# Patient Record
Sex: FEMALE | Race: OTHER | URBAN - METROPOLITAN AREA
[De-identification: names, ages, dates, MRNs, and addresses within clinical notes are randomized per-mention and may not be internally consistent; named-entity substitution may affect disease eponyms.]

---

## 2019-09-11 ENCOUNTER — OFFICE VISIT (OUTPATIENT)
Dept: FAMILY MEDICINE CLINIC | Age: 54
End: 2019-09-11

## 2019-09-11 VITALS
SYSTOLIC BLOOD PRESSURE: 140 MMHG | WEIGHT: 177 LBS | DIASTOLIC BLOOD PRESSURE: 84 MMHG | TEMPERATURE: 96.6 F | RESPIRATION RATE: 18 BRPM | HEART RATE: 66 BPM | OXYGEN SATURATION: 99 %

## 2019-09-11 DIAGNOSIS — F43.9 STRESS: ICD-10-CM

## 2019-09-11 DIAGNOSIS — M79.601 PAIN IN INFERIOR RIGHT UPPER EXTREMITY: Primary | ICD-10-CM

## 2019-09-11 RX ORDER — CYCLOBENZAPRINE HCL 10 MG
10 TABLET ORAL
Qty: 14 TAB | Refills: 1 | Status: SHIPPED | OUTPATIENT
Start: 2019-09-11 | End: 2019-09-26 | Stop reason: SDUPTHER

## 2019-09-11 NOTE — PATIENT INSTRUCTIONS
Elinor: Ejercicios - [ Neck: Exercises ]  Instrucciones de cuidado  Aquí se presentan algunos ejemplos de ejercicios típicos de rehabilitación para tratar haney afección. Empiece cada ejercicio lentamente. Reduzca la intensidad del ejercicio si Bernis Sabot a tener dolor. Haney médico o fisioterapeuta le dirán cuándo puede comenzar con estos ejercicios y cuáles funcionarán mejor para usted. Cómo hacer los ejercicios  Estiramiento del elinor    1. Marianela estiramiento funciona mejor si mantiene el hombro hacia abajo mientras se inclina en sentido contrario. Para ayudarle a acordarse de esto, empiece por relajar los hombros y asirse levemente a henrique muslos o a haney silla. 2. Incline la lila hacia el hombro y Bennet 15 a 30 segundos. Deje que el peso de la lila estire los músculos. 3. Si desea un pequeño estiramiento adicional, use haney mano para jalar Pharr y constantemente la lila hacia el hombro. Por ejemplo, con haney hombro derecho hacia abajo, incline haeny lila hacia la izquierda. 4. Repita de 2 a 4 veces para cada hombro. Estiramiento del elinor en diagonal    1. Gire la lila ligeramente en la dirección en la que esté haciendo la extensión, e incline la lila en diagonal hacia el pecho y Bennet 15 a 30 segundos. 2. Si desea un pequeño estiramiento adicional, use haney mano para jalar suave y constantemente la lila hacia adelante en diagonal.  3. Repita de 2 a 4 veces para cada lado. Estiramiento con deslizamiento dorsal    1. Siéntese o párese derecho y [employer]. 2. Poco a poco meta la barbilla mientras desliza la lila hacia atrás sobre haney cuerpo. 3. Manténgala contando hasta 6 y luego relájela aris 10 segundos. 4. Repita de 8 a 12 veces. Estiramiento del pecho y del hombro    1.  Siéntese o párese derecho y deslice la lila hacia atrás kyle en el estiramiento con deslizamiento dorsal.  2. Levante ambos brazos para que henrique elbert queden cerca de los oídos.  3. [place] venus respiración profunda, y a medida que [place], lleve los codos Friend abajo y detrás de la espalda. Sentirá que los omóplatos Rosa Alert y Asia Le, y al mismo tiempo sentirá un estiramiento en el pecho y en la parte delantera de henrique hombros.  4. Manténgalos así aris unos 6 segundos y luego relájelos aris 10 segundos. 5. Repita de 8 a 12 veces. Fortalecimiento: Judith en la lila    1. Mueva haney lila hacia atrás, hacia adelante y de lado a lado en venus suave presión contra henrique judith, manteniendo cada posición aris unos 6 segundos. 2. Repita de 8 a 12 veces. La atención de seguimiento es venus parte clave de haney tratamiento y seguridad. Asegúrese de hacer y acudir a todas las citas, y llame a haney médico si está teniendo problemas. También es venus buena idea saber los resultados de henrique exámenes y mantener venus lista de los medicamentos que hay. ¿Dónde puede encontrar más información en inglés? Erin isbell http://nahum-darwin.info/. Escriba P975 en la búsqueda para aprender más acerca de \"Elinor: Ejercicios - [ Neck: Exercises ]. \"  Revisado: 20 septiembre, 2018  Versión del contenido: 12.1  © 3268-3672 [employer]wise, Inc[employer]. Las instrucciones de cuidado fueron adaptadas bajo licencia por [employer] (which disclaims liability or warranty for this information). Si usted tiene [place] afección médica o sobre estas instrucciones, siempre pregunte a haney profesional de damion. Healthwise, Incorporated niega toda garantía o responsabilidad por haney uso de esta información.

## 2019-09-11 NOTE — PROGRESS NOTES
Chief Complaint   Patient presents with    Physical     check up    New Patient     Chief Complaint   Patient presents with    Physical     check up    New Patient     1. When and where did you last receive medical care? Yes When: 6/2019 - bug bites on right leg - [facility]  2. When and where did you last have preventive care such as mammogram, pap smears or colon screening? No history of MAMMO or  PAP screeing  3. What is your current living situation (for example, live alone, live in home with immediate family members)? Lives with  and youngest daughter and grandaughter    4. Do you have any problems with communication such trouble seeing, hearing, or understanding instructions? Yes  Khmer only -   5. Do you have an advance directive? This is a document that you can give to family members with instructions for how you would want them to make health care decisions for you if you were unable to speak for yourself. (For example, unconscious, delerious)No    PMH/FH/Social Hx reviewed and updated as needed     Applicable screenings reviewed and updated as needed  Medication reconciliation performed. Patient does not know need medication refills. Health Maintenance reviewed.

## 2019-09-11 NOTE — PROGRESS NOTES
LON Jeter is a 48 y.o. female being seen today for   Chief Complaint   Patient presents with    Physical     check up    New Patient   . IOV for this pt to care a Theaubree Miller.   she states that she is generally healthy. Here for physical.   Has not had pap or mammogram ever. Feels pain radiating down right arm to hand. Has jonathan present for a month but feels similar to pinched nerve she had last year. Last year resolved with massage. No new injury. Recent stress when her daughter left or was deported. Sometimes with chest pain and palpitations as well since that time. No pattern to pain. She has not noticed any worsening with exxercise or activity. This is when her pain started but she had no injury. Also has sweats at night. History reviewed. No pertinent past medical history. ROS  Patient states that she is feeling well. Denies complaints of , shortness of breath, swelling of legs, dizziness or weakness. she denies nausea, vomiting or diarrhea. Current Outpatient Medications   Medication Sig    cyclobenzaprine (FLEXERIL) 10 mg tablet Take 1 Tab by mouth nightly. No current facility-administered medications for this visit. PE  Visit Vitals  /84 (BP 1 Location: Right arm, BP Patient Position: Sitting)   Pulse 66   Temp 96.6 °F (35.9 °C) (Temporal)   Resp 18   Wt 177 lb (80.3 kg)   LMP 2019 (Approximate) Comment: 3 months since last cycle   SpO2 99%        Alert and oriented with normal mood and affect. she is well developed and well nourished . Lungs are clear without wheezing. Heart rate is regular without murmurs or gallops. There is no lower extremity edema. Normal ROM neck but does have reproduction of pain with extremes of rotation or with palpation base of neck on right. Normal  strength and sensation in hands. No results found for this or any previous visit. Assessment and Plan:        ICD-10-CM ICD-9-CM    1.  Pain in inferior right upper extremity  Likely pinched nerve  Trial HEP and home exercises  Start financial screening for PT and mammogram M79.601 729.5    2.  Stress  reveiewed stress reduction strategies F43.9 V62.89        Follow up 2-4 weeks for WWE and recheck and flu shot  She may not be eligible for mammogram through [facility] so will start [address] screening      Allie Olson MD

## 2019-09-26 ENCOUNTER — HOSPITAL ENCOUNTER (OUTPATIENT)
Dept: LAB | Age: 54
Discharge: HOME | End: 2019-09-26

## 2019-09-26 ENCOUNTER — OFFICE VISIT (OUTPATIENT)
Dept: FAMILY MEDICINE CLINIC | Age: 54
End: 2019-09-26

## 2019-09-26 VITALS
HEIGHT: 67 IN | SYSTOLIC BLOOD PRESSURE: 149 MMHG | RESPIRATION RATE: 14 BRPM | WEIGHT: 172 LBS | DIASTOLIC BLOOD PRESSURE: 90 MMHG | BODY MASS INDEX: 27 KG/M2

## 2019-09-26 DIAGNOSIS — Z23 ENCOUNTER FOR IMMUNIZATION: ICD-10-CM

## 2019-09-26 DIAGNOSIS — Z12.11 SCREENING FOR COLON CANCER: ICD-10-CM

## 2019-09-26 DIAGNOSIS — Z12.4 SCREENING FOR CERVICAL CANCER: Primary | ICD-10-CM

## 2019-09-26 PROCEDURE — 88142 CYTOPATH C/V THIN LAYER: CPT

## 2019-09-26 RX ORDER — CYCLOBENZAPRINE HCL 10 MG
10 TABLET ORAL
Qty: 30 TAB | Refills: 1 | Status: SHIPPED | OUTPATIENT
Start: 2019-09-26 | End: 2020-01-24 | Stop reason: SDUPTHER

## 2019-09-26 NOTE — PROGRESS NOTES
Callie Kent is a 48 y.o. female who presents for routine immunizations. She denies any symptoms , reactions or allergies that would exclude them from being immunized today. Risks and adverse reactions were discussed and the VIS was given to them. All questions were addressed. She was observed for 10 min post injection. There were no reactions observed.     Vermond [employer]

## 2019-09-26 NOTE — PATIENT INSTRUCTIONS
Vaccine Information Statement    Influenza (Flu) Vaccine (Inactivated or Recombinant): What You Need to Know    Many Vaccine Information Statements are available in Yoruba and other languages. See www.immunize.org/vis  Hojas de información sobre vacunas están disponibles en español y en muchos otros idiomas. Visite www.immunize.org/vis    1. Why get vaccinated? Influenza vaccine can prevent influenza (flu). Flu is a contagious disease that spreads around the [facility] every year, usually between October and May. Anyone can get the flu, but it is more dangerous for some people. Infants and young children, people 72years of age and older, pregnant women, and people with certain health conditions or a weakened immune system are at greatest risk of flu complications. Pneumonia, bronchitis, sinus infections and ear infections are examples of flu-related complications. If you have a medical condition, such as heart disease, cancer or diabetes, flu can make it worse. Flu can cause fever and chills, sore throat, muscle aches, fatigue, cough, headache, and runny or stuffy nose. Some people may have vomiting and diarrhea, though this is more common in children than adults. Each year thousands of people in the [facility] die from flu, and many more are hospitalized. Flu vaccine prevents millions of illnesses and flu-related visits to the doctor each year. 2. Influenza vaccines     CDC recommends everyone 10months of age and older get vaccinated every flu season. Children 6 months through 6years of age may need 2 doses during a single flu season. Everyone else needs only 1 dose each flu season. It takes about 2 weeks for protection to develop after vaccination. There are many flu viruses, and they are always changing. Each year a new flu vaccine is made to protect against three or four viruses that are likely to cause disease in the upcoming flu season.  Even when the vaccine doesnt exactly match these viruses, it may still provide some protection. Influenza vaccine does not cause flu. Influenza vaccine may be given at the same time as other vaccines. 3. Talk with your health care provider    Tell your vaccine provider if the person getting the vaccine:   Has had an allergic reaction after a previous dose of influenza vaccine, or has any severe, life-threatening allergies.  Has ever had Guillain-Barré Syndrome (also called GBS). In some cases, your health care provider may decide to postpone influenza vaccination to a future visit. People with minor illnesses, such as a cold, may be vaccinated. People who are moderately or severely ill should usually wait until they recover before getting influenza vaccine. Your health care provider can give you more information. 4. Risks of a reaction     Soreness, redness, and swelling where shot is given, fever, muscle aches, and headache can happen after influenza vaccine.  There may be a very small increased risk of Guillain-Barré Syndrome (GBS) after inactivated influenza vaccine (the flu shot). Desi Dietrich children who get the flu shot along with pneumococcal vaccine (PCV13), and/or DTaP vaccine at the same time might be slightly more likely to have a seizure caused by fever. Tell your health care provider if a child who is getting flu vaccine has ever had a seizure. People sometimes faint after medical procedures, including vaccination. Tell your provider if you feel dizzy or have vision changes or ringing in the ears. As with any medicine, there is a very remote chance of a vaccine causing a severe allergic reaction, other serious injury, or death. 5. What if there is a serious problem? An allergic reaction could occur after the vaccinated person leaves the clinic.  If you see signs of a severe allergic reaction (hives, swelling of the face and throat, difficulty breathing, a fast heartbeat, dizziness, or weakness), call 9-1-1 and get the person to the nearest hospital.    For other signs that concern you, call your health care provider. Adverse reactions should be reported to the Vaccine Adverse Event Reporting System (VAERS). Your health care provider will usually file this report, or you can do it yourself. Visit the VAERS website at www.vaers. [facility].gov or call 0-773.796.3510. VAERS is only for reporting reactions, and VAERS staff do not give medical advice. 6. The National Vaccine Injury Compensation Program    The [facility] Vaccine Injury Compensation Program (VICP) is a federal program that was created to compensate people who may have been injured by certain vaccines. Visit the VICP website at www.[facility].gov/vaccinecompensation or call 6-447.171.3673 to learn about the program and about filing a claim. There is a time limit to file a claim for compensation. 7. How can I learn more?  Ask your health care provider.  Call your local or state health department.  Contact the Centers for Disease Control and Prevention (CDC):  - Call 5-177.850.6387 (1-800-CDC-INFO) or  - Visit CDCs influenza website at www.cdc.gov/flu    Vaccine Information Statement (Interim)  Inactivated Influenza Vaccine   8/15/2019  42 BREANN Menon [id number]   Department of Health and Human Services  Centers for Disease Control and Prevention    Office Use Only

## 2019-09-26 NOTE — PROGRESS NOTES
LON Ramirez is a 48 y.o. female being seen today for   Chief Complaint   Patient presents with    Well Woman     PAP Smear    Medication Refill     flexeril   . follow up for this pt with neck pain and pinched nerve. she states that the flexeril worked for her arm pain. She is taking every other nigiht. Would like refill. Also here for  pap and flu shot. She has never had pap before. Still having periods but they are spacing out to every few months. No past medical history on file. ROS  Patient states that she is feeling well. Denies complaints of chest pain, shortness of breath, swelling of legs, dizziness or weakness. she denies nausea, vomiting or diarrhea. Current Outpatient Medications   Medication Sig    cyclobenzaprine (FLEXERIL) 10 mg tablet Take 1 Tab by mouth nightly. No current facility-administered medications for this visit. PE  Visit Vitals  /90 (BP 1 Location: Left arm, BP Patient Position: Sitting)   Resp 14   Ht 5' 7\" (1.702 m)   Wt 172 lb (78 kg)   BMI 26.94 kg/m²        Alert and oriented with normal mood and affect. she is well developed and well nourished . Speculum exam shows normal appearing external genitalia and cervix. Bimanual exam with no mass or tenderness. No results found for this or any previous visit. Assessment and Plan:        ICD-10-CM ICD-9-CM    1. Screening for cervical cancer Z12.4 V76.2 PAP, LB, RFX HPV SOAAR([id number])   2.  Screening for colon cancer Z12.11 V76.51 OCCULT BLOOD IMMUNOASSAY,DIAGNOSTIC       Pap smear today  Refill flexeril  Return prn  Will order mammogram when her financial screening is complete    Tamara Whittington MD

## 2019-10-03 ENCOUNTER — TELEPHONE (OUTPATIENT)
Dept: FAMILY MEDICINE CLINIC | Age: 54
End: 2019-10-03

## 2019-10-04 NOTE — TELEPHONE ENCOUNTER
I called this patient and was unable to reach her. I tried to leave a voice mail but her phone did not allow me to. I will try again on both phones. ,

## 2019-10-07 ENCOUNTER — HOSPITAL ENCOUNTER (OUTPATIENT)
Dept: LAB | Age: 54
Discharge: HOME | End: 2019-10-07

## 2019-10-07 DIAGNOSIS — Z12.11 SCREENING FOR COLON CANCER: ICD-10-CM

## 2019-10-07 PROCEDURE — 82274 ASSAY TEST FOR BLOOD FECAL: CPT

## 2019-10-15 LAB — HEMOCCULT STL QL IA: NEGATIVE

## 2020-01-24 ENCOUNTER — OFFICE VISIT (OUTPATIENT)
Dept: FAMILY MEDICINE CLINIC | Age: 55
End: 2020-01-24

## 2020-01-24 ENCOUNTER — HOSPITAL ENCOUNTER (OUTPATIENT)
Dept: LAB | Age: 55
Discharge: HOME | End: 2020-01-24

## 2020-01-24 VITALS
HEIGHT: 67 IN | WEIGHT: 176 LBS | BODY MASS INDEX: 27.62 KG/M2 | HEART RATE: 75 BPM | SYSTOLIC BLOOD PRESSURE: 142 MMHG | RESPIRATION RATE: 17 BRPM | OXYGEN SATURATION: 97 % | DIASTOLIC BLOOD PRESSURE: 84 MMHG | TEMPERATURE: 97.4 F

## 2020-01-24 DIAGNOSIS — R73.9 ELEVATED BLOOD SUGAR: Primary | ICD-10-CM

## 2020-01-24 DIAGNOSIS — E11.9 CONTROLLED TYPE 2 DIABETES MELLITUS WITHOUT COMPLICATION, WITHOUT LONG-TERM CURRENT USE OF INSULIN (HCC): ICD-10-CM

## 2020-01-24 PROCEDURE — 82043 UR ALBUMIN QUANTITATIVE: CPT

## 2020-01-24 PROCEDURE — 84443 ASSAY THYROID STIM HORMONE: CPT

## 2020-01-24 PROCEDURE — 80053 COMPREHEN METABOLIC PANEL: CPT

## 2020-01-24 PROCEDURE — 80061 LIPID PANEL: CPT

## 2020-01-24 PROCEDURE — 85025 COMPLETE CBC W/AUTO DIFF WBC: CPT

## 2020-01-24 RX ORDER — METFORMIN HYDROCHLORIDE 500 MG/1
500 TABLET, EXTENDED RELEASE ORAL 2 TIMES DAILY
Qty: 60 TAB | Refills: 2 | Status: SHIPPED | OUTPATIENT
Start: 2020-01-24 | End: 2020-05-05 | Stop reason: SDUPTHER

## 2020-01-24 RX ORDER — CYCLOBENZAPRINE HCL 10 MG
10 TABLET ORAL
Qty: 30 TAB | Refills: 1 | Status: SHIPPED | OUTPATIENT
Start: 2020-01-24 | End: 2020-03-15

## 2020-01-24 RX ORDER — GLIPIZIDE 5 MG/1
5 TABLET ORAL 2 TIMES DAILY
Qty: 60 TAB | Refills: 2 | Status: SHIPPED | OUTPATIENT
Start: 2020-01-24 | End: 2020-05-05 | Stop reason: SDUPTHER

## 2020-01-24 NOTE — PROGRESS NOTES
Diabetic foot exam performed by Rakan Garrison LPN      Measurement  Response Nurse Comment Physician Comment   Monofilament  R - 6/6  L - 6/6     Pulse DP R - present  L - present     Pulse TP R - present  L - present     Structural deformity R - no     Skin Integrity / Deformity R - no          Reviewed by:      Blood work drawn      Discharge instructions reviewed with patient    Medication list and understanding of medications reviewed with patient. OTC and herbal medications reviewed and added to med list if applicable  Barriers to adherence assessed. Guidance given regarding new medications this visit, including reason for taking this medicine, and common side effects. AVS given to patient. Explained to patient. Patient expressed understanding.

## 2020-01-24 NOTE — PROGRESS NOTES
Chief Complaint   Patient presents with    Other     refill/ check for diabetes   1. Have you been to the ER, urgent care clinic since your last visit? Hospitalized since your last visit? No    2. Have you seen or consulted any other health care providers outside of the [address] [place] [address] since your last visit? Include any pap smears or colon screening.  No

## 2020-01-24 NOTE — PROGRESS NOTES
HPI  Melecio Bower is a 47 y.o. female being seen today for   Chief Complaint   Patient presents with    Other     refill/ check for diabetes   . she states that she would like to get screened for diabetes. She has checked her blood sugars at home and they are high. Sometimes she feels nauseated and her sugars have been up to 400. Blurry vision at these tmes as well but it resolves. She walks sometimes for exercise. Tries to eat healthy, some tortillas but a lot of vegetables. Rarely eats sweets. Some carbs but she tries to limit these. No family history of diabetes. No smoking or drinking alcohol  She rarely drinks soda, mostly water. History reviewed. No pertinent past medical history. ROS  Patient states that she is feeling well. Denies complaints of chest pain, shortness of breath, swelling of legs, dizziness or weakness. she denies nausea, vomiting or diarrhea. Current Outpatient Medications   Medication Sig    cyclobenzaprine (FLEXERIL) 10 mg tablet Take 1 Tab by mouth nightly as needed for Muscle Spasm(s).  metFORMIN ER (GLUCOPHAGE XR) 500 mg tablet Take 1 Tab by mouth two (2) times a day.  glipiZIDE (GLUCOTROL) 5 mg tablet Take 1 Tab by mouth two (2) times a day. No current facility-administered medications for this visit. PE  Visit Vitals  /84 (BP 1 Location: Right arm, BP Patient Position: Sitting)   Pulse 75   Temp 97.4 °F (36.3 °C) (Temporal)   Resp 17   Ht 5' 7\" (1.702 m)   Wt 176 lb (79.8 kg)   SpO2 97%   BMI 27.57 kg/m²        Alert and oriented with normal mood and affect. she is well developed and well nourished . Lungs are clear without wheezing. Heart rate is regular without murmurs or gallops. There is no lower extremity edema.      Results for orders placed or performed during the hospital encounter of 10/07/19   OCCULT BLOOD IMMUNOASSAY,DIAGNOSTIC   Result Value Ref Range    Occult blood fecal, by IA NEGATIVE  NEGATIVE Assessment and Plan:        ICD-10-CM ICD-9-CM    1. Elevated blood sugar R73.9 790.29 AMB POC HEMOGLOBIN A1C   2. Controlled type 2 diabetes mellitus without complication, without long-term current use of insulin (HCC) E11.9 250.00      New dx diabetes. May need insulin but will start with oral therapy and diet control. She was given a meter and return for a visit in a few weeks to review bg log.         Heather Simpson MD

## 2020-01-25 LAB
ALBUMIN SERPL-MCNC: 3.8 G/DL (ref 3.4–5)
ALBUMIN/GLOB SERPL: 0.9 {RATIO} (ref 0.8–1.7)
ALP SERPL-CCNC: 143 U/L (ref 45–117)
ALT SERPL-CCNC: 49 U/L (ref 13–56)
ANION GAP SERPL CALC-SCNC: 7 MMOL/L (ref 3–18)
AST SERPL-CCNC: 34 U/L (ref 10–38)
BASOPHILS # BLD: 0 K/UL (ref 0–0.1)
BASOPHILS NFR BLD: 0 % (ref 0–2)
BILIRUB SERPL-MCNC: 0.3 MG/DL (ref 0.2–1)
BUN SERPL-MCNC: 8 MG/DL (ref 7–18)
BUN/CREAT SERPL: 11 (ref 12–20)
CALCIUM SERPL-MCNC: 9.7 MG/DL (ref 8.5–10.1)
CHLORIDE SERPL-SCNC: 102 MMOL/L (ref 100–111)
CHOLEST SERPL-MCNC: 275 MG/DL
CO2 SERPL-SCNC: 28 MMOL/L (ref 21–32)
CREAT SERPL-MCNC: 0.76 MG/DL (ref 0.6–1.3)
CREAT UR-MCNC: 50 MG/DL (ref 30–125)
DIFFERENTIAL METHOD BLD: ABNORMAL
EOSINOPHIL # BLD: 0 K/UL (ref 0–0.4)
EOSINOPHIL NFR BLD: 1 % (ref 0–5)
ERYTHROCYTE [DISTWIDTH] IN BLOOD BY AUTOMATED COUNT: 13.1 % (ref 11.6–14.5)
GLOBULIN SER CALC-MCNC: 4.1 G/DL (ref 2–4)
GLUCOSE SERPL-MCNC: 397 MG/DL (ref 74–99)
HCT VFR BLD AUTO: 43.7 % (ref 35–45)
HDLC SERPL-MCNC: 59 MG/DL (ref 40–60)
HDLC SERPL: 4.7 {RATIO} (ref 0–5)
HGB BLD-MCNC: 14.6 G/DL (ref 12–16)
LDLC SERPL CALC-MCNC: 169.2 MG/DL (ref 0–100)
LIPID PROFILE,FLP: ABNORMAL
LYMPHOCYTES # BLD: 2 K/UL (ref 0.9–3.6)
LYMPHOCYTES NFR BLD: 36 % (ref 21–52)
MCH RBC QN AUTO: 30.4 PG (ref 24–34)
MCHC RBC AUTO-ENTMCNC: 33.4 G/DL (ref 31–37)
MCV RBC AUTO: 90.9 FL (ref 74–97)
MICROALBUMIN UR-MCNC: <0.5 MG/DL (ref 0–3)
MICROALBUMIN/CREAT UR-RTO: NORMAL MG/G (ref 0–30)
MONOCYTES # BLD: 0.4 K/UL (ref 0.05–1.2)
MONOCYTES NFR BLD: 7 % (ref 3–10)
NEUTS SEG # BLD: 3.1 K/UL (ref 1.8–8)
NEUTS SEG NFR BLD: 56 % (ref 40–73)
PLATELET # BLD AUTO: 232 K/UL (ref 135–420)
PMV BLD AUTO: 12.3 FL (ref 9.2–11.8)
POTASSIUM SERPL-SCNC: 5.1 MMOL/L (ref 3.5–5.5)
PROT SERPL-MCNC: 7.9 G/DL (ref 6.4–8.2)
RBC # BLD AUTO: 4.81 M/UL (ref 4.2–5.3)
SODIUM SERPL-SCNC: 137 MMOL/L (ref 136–145)
TRIGL SERPL-MCNC: 234 MG/DL (ref ?–150)
TSH SERPL DL<=0.05 MIU/L-ACNC: 2.31 UIU/ML (ref 0.36–3.74)
VLDLC SERPL CALC-MCNC: 46.8 MG/DL
WBC # BLD AUTO: 5.5 K/UL (ref 4.6–13.2)

## 2020-01-30 PROBLEM — E11.9 CONTROLLED TYPE 2 DIABETES MELLITUS WITHOUT COMPLICATION, WITHOUT LONG-TERM CURRENT USE OF INSULIN (HCC): Status: ACTIVE | Noted: 2020-01-30

## 2020-02-04 ENCOUNTER — TELEPHONE (OUTPATIENT)
Dept: FAMILY MEDICINE CLINIC | Age: 55
End: 2020-02-04

## 2020-02-04 NOTE — TELEPHONE ENCOUNTER
I called this patient per provider request but the voice mail has not been set up. I was not able to leave a message. I will continue trying .

## 2020-02-11 ENCOUNTER — TELEPHONE (OUTPATIENT)
Dept: FAMILY MEDICINE CLINIC | Age: 55
End: 2020-02-11

## 2020-02-11 NOTE — TELEPHONE ENCOUNTER
Kelly Rubin LPN   Licensed Nurse      Telephone Encounter   Signed   Encounter Date:  2/4/2020                    []Hide copied text    []Hover for details  THis is a corrected message from previously submitted Telephone Encounter.  :  I reached this Venezuelan Speaking Only patient at home and related the provider requested message in [address] The patient stated that she understood the results of her blood work. Patient requested the provider send her the new prescription to her pharmacy where she will pick it up tomorrow.       Electronically signed by Kelly Rubin LPN at 39/87/51 8723   Note Details     Author Kelly Rubin LPN File Time 83/38/67 [id number]   Author Type Licensed Nurse Status Signed   Last  Kelly Rubin LPN Service (none)       Telephone on 2/4/2020          Detailed Report

## 2020-02-11 NOTE — TELEPHONE ENCOUNTER
I reached this Greek Speaking Only patient at home and related the provider requested message in [address] The patient stated that she understood the results of both her blood work and the ultrasound and requested the provider send her the new prescription to her pharmacy where she will pick it up tomorrow.

## 2020-02-11 NOTE — TELEPHONE ENCOUNTER
----- Message from Sharyle Addison, MD sent at 1/27/2020  2:33 PM EST -----  I will send a letter with her results but her kidneys are healthy and all tests are normal except high cholesterol and high blood sugar. This may get better as we improve her blood sugars but it is recommended for her to take a cholesterol medicine as well. I can call this in for her or she can wait to follow up appt.      Thank you!  ----- Message -----  From: Freedom Hyman In [place]  Sent: 1/25/2020  12:16 AM EST  To: Sharyle Addison, MD

## 2020-02-13 RX ORDER — ATORVASTATIN CALCIUM 40 MG/1
40 TABLET, FILM COATED ORAL DAILY
Qty: 30 TAB | Refills: 5 | Status: SHIPPED | OUTPATIENT
Start: 2020-02-13 | End: 2020-11-05 | Stop reason: SDUPTHER

## 2020-03-15 RX ORDER — CYCLOBENZAPRINE HCL 10 MG
TABLET ORAL
Qty: 30 TAB | Refills: 0 | Status: SHIPPED | OUTPATIENT
Start: 2020-03-15 | End: 2020-05-05 | Stop reason: SDUPTHER

## 2020-05-05 RX ORDER — GLIPIZIDE 5 MG/1
5 TABLET ORAL 2 TIMES DAILY
Qty: 60 TAB | Refills: 2 | Status: SHIPPED | OUTPATIENT
Start: 2020-05-05 | End: 2020-11-05 | Stop reason: SDUPTHER

## 2020-05-05 RX ORDER — CYCLOBENZAPRINE HCL 10 MG
TABLET ORAL
Qty: 30 TAB | Refills: 0 | Status: SHIPPED | OUTPATIENT
Start: 2020-05-05 | End: 2020-11-05 | Stop reason: SDUPTHER

## 2020-05-05 RX ORDER — METFORMIN HYDROCHLORIDE 500 MG/1
500 TABLET, EXTENDED RELEASE ORAL 2 TIMES DAILY
Qty: 60 TAB | Refills: 2 | Status: SHIPPED | OUTPATIENT
Start: 2020-05-05 | End: 2020-11-05 | Stop reason: SDUPTHER

## 2020-11-05 ENCOUNTER — OFFICE VISIT (OUTPATIENT)
Dept: FAMILY MEDICINE CLINIC | Facility: CLINIC | Age: 55
End: 2020-11-05

## 2020-11-05 ENCOUNTER — HOSPITAL ENCOUNTER (OUTPATIENT)
Dept: LAB | Age: 55
Discharge: HOME | End: 2020-11-05

## 2020-11-05 VITALS
HEIGHT: 67 IN | DIASTOLIC BLOOD PRESSURE: 70 MMHG | RESPIRATION RATE: 16 BRPM | SYSTOLIC BLOOD PRESSURE: 135 MMHG | WEIGHT: 171 LBS | OXYGEN SATURATION: 97 % | BODY MASS INDEX: 26.84 KG/M2 | TEMPERATURE: 97.3 F | HEART RATE: 64 BPM

## 2020-11-05 DIAGNOSIS — Z23 NEEDS FLU SHOT: ICD-10-CM

## 2020-11-05 DIAGNOSIS — E11.9 CONTROLLED TYPE 2 DIABETES MELLITUS WITHOUT COMPLICATION, WITHOUT LONG-TERM CURRENT USE OF INSULIN (HCC): Primary | ICD-10-CM

## 2020-11-05 DIAGNOSIS — Z12.11 SCREENING FOR COLON CANCER: ICD-10-CM

## 2020-11-05 DIAGNOSIS — E11.9 CONTROLLED TYPE 2 DIABETES MELLITUS WITHOUT COMPLICATION, WITHOUT LONG-TERM CURRENT USE OF INSULIN (HCC): ICD-10-CM

## 2020-11-05 LAB
ALBUMIN SERPL-MCNC: 4.2 G/DL (ref 3.4–5)
ALBUMIN/GLOB SERPL: 1.1 {RATIO} (ref 0.8–1.7)
ALP SERPL-CCNC: 119 U/L (ref 45–117)
ALT SERPL-CCNC: 35 U/L (ref 13–56)
ANION GAP SERPL CALC-SCNC: 5 MMOL/L (ref 3–18)
AST SERPL-CCNC: 17 U/L (ref 10–38)
BILIRUB SERPL-MCNC: 0.6 MG/DL (ref 0.2–1)
BUN SERPL-MCNC: 10 MG/DL (ref 7–18)
BUN/CREAT SERPL: 16 (ref 12–20)
CALCIUM SERPL-MCNC: 10 MG/DL (ref 8.5–10.1)
CHLORIDE SERPL-SCNC: 109 MMOL/L (ref 100–111)
CHOLEST SERPL-MCNC: 180 MG/DL
CO2 SERPL-SCNC: 29 MMOL/L (ref 21–32)
CREAT SERPL-MCNC: 0.62 MG/DL (ref 0.6–1.3)
EST. AVERAGE GLUCOSE BLD GHB EST-MCNC: 194 MG/DL
GLOBULIN SER CALC-MCNC: 4 G/DL (ref 2–4)
GLUCOSE SERPL-MCNC: 50 MG/DL (ref 74–99)
HBA1C MFR BLD: 8.4 % (ref 4.2–5.6)
HDLC SERPL-MCNC: 63 MG/DL (ref 40–60)
HDLC SERPL: 2.9 {RATIO} (ref 0–5)
LDLC SERPL CALC-MCNC: 75.2 MG/DL (ref 0–100)
LIPID PROFILE,FLP: ABNORMAL
POTASSIUM SERPL-SCNC: 3.6 MMOL/L (ref 3.5–5.5)
PROT SERPL-MCNC: 8.2 G/DL (ref 6.4–8.2)
SODIUM SERPL-SCNC: 143 MMOL/L (ref 136–145)
TRIGL SERPL-MCNC: 209 MG/DL (ref ?–150)
VLDLC SERPL CALC-MCNC: 41.8 MG/DL

## 2020-11-05 PROCEDURE — 3052F HG A1C>EQUAL 8.0%<EQUAL 9.0%: CPT | Performed by: FAMILY MEDICINE

## 2020-11-05 PROCEDURE — 80061 LIPID PANEL: CPT

## 2020-11-05 PROCEDURE — 80053 COMPREHEN METABOLIC PANEL: CPT

## 2020-11-05 PROCEDURE — 36415 COLL VENOUS BLD VENIPUNCTURE: CPT

## 2020-11-05 PROCEDURE — 99213 OFFICE O/P EST LOW 20 MIN: CPT | Performed by: FAMILY MEDICINE

## 2020-11-05 PROCEDURE — 90471 IMMUNIZATION ADMIN: CPT | Performed by: FAMILY MEDICINE

## 2020-11-05 PROCEDURE — 83036 HEMOGLOBIN GLYCOSYLATED A1C: CPT

## 2020-11-05 PROCEDURE — 90686 IIV4 VACC NO PRSV 0.5 ML IM: CPT | Performed by: FAMILY MEDICINE

## 2020-11-05 RX ORDER — METFORMIN HYDROCHLORIDE 500 MG/1
500 TABLET, EXTENDED RELEASE ORAL 2 TIMES DAILY
Qty: 60 TAB | Refills: 5 | Status: SHIPPED | OUTPATIENT
Start: 2020-11-05 | End: 2021-06-17 | Stop reason: SDUPTHER

## 2020-11-05 RX ORDER — ATORVASTATIN CALCIUM 40 MG/1
40 TABLET, FILM COATED ORAL DAILY
Qty: 30 TAB | Refills: 5 | Status: SHIPPED | OUTPATIENT
Start: 2020-11-05 | End: 2021-06-17 | Stop reason: SDUPTHER

## 2020-11-05 RX ORDER — GLIPIZIDE 5 MG/1
5 TABLET ORAL 2 TIMES DAILY
Qty: 60 TAB | Refills: 5 | Status: SHIPPED | OUTPATIENT
Start: 2020-11-05 | End: 2021-06-17 | Stop reason: SDUPTHER

## 2020-11-05 RX ORDER — CYCLOBENZAPRINE HCL 10 MG
TABLET ORAL
Qty: 30 TAB | Refills: 1 | Status: SHIPPED | OUTPATIENT
Start: 2020-11-05 | End: 2021-03-24

## 2020-11-05 NOTE — PROGRESS NOTES
Patient presents for lab draw ordered by:kaity     Ordering Provider:  Dr. Sim Mesa  Ordering Department/Practice:  [facility]  Phone:  [phone]  Date Ordered:  11/05/2020    The following labs were drawn and sent to [facility]   by Guillaume Rojas:    BMP, Lipid Profile and HgA1C    The following tubes were sent:    Gold  ( 1) and Lavender  ( 1)    Draw site right  brachial.  Patient tolerated draw with no distress.

## 2020-11-05 NOTE — PROGRESS NOTES
LON Cardona is a 54 y.o. female being seen today for   Chief Complaint   Patient presents with    Diabetes   . follow up for this pt with diabetes. She was diagnosed in January and started on metformin and glipizide. On phone follow up her sugars were all in the 100s but we have not seen her in 9 months and her meds have not been refilled. She has a family member interpreting for her today. (waiver signed)   she states that she needs med refills and feels fine. History reviewed. No pertinent past medical history. ROS  Patient states that she is feeling well. Denies complaints of chest pain, shortness of breath, swelling of legs, dizziness or weakness. she denies nausea, vomiting or diarrhea. Current Outpatient Medications   Medication Sig    metFORMIN ER (GLUCOPHAGE XR) 500 mg tablet Take 1 Tab by mouth two (2) times a day.  glipiZIDE (GLUCOTROL) 5 mg tablet Take 1 Tab by mouth two (2) times a day.  cyclobenzaprine (FLEXERIL) 10 mg tablet TAKE 1 TABLET BY MOUTH NIGHTLY AS NEEDED FOR MUSCLE SPASM    atorvastatin (LIPITOR) 40 mg tablet Take 1 Tab by mouth daily. No current facility-administered medications for this visit. PE  Visit Vitals  /70 (BP 1 Location: Left arm, BP Patient Position: Sitting)   Pulse 64   Temp 97.3 °F (36.3 °C) (Temporal)   Resp 16   Ht 5' 7\" (1.702 m)   Wt 171 lb (77.6 kg)   SpO2 97%   BMI 26.78 kg/m²        Alert and oriented with normal mood and affect. she is well developed and well nourished . Lungs are clear without wheezing. Heart rate is regular without murmurs or gallops. There is no lower extremity edema. Assessment and Plan:        ICD-10-CM ICD-9-CM    1. Controlled type 2 diabetes mellitus without complication, without long-term current use of insulin ([facility])  N74.7 066.45 METABOLIC PANEL, COMPREHENSIVE      LIPID PANEL      HEMOGLOBIN A1C WITH EAG   2.  Needs flu shot  Z23 V04.81 INFLUENZA VIRUS VAC QUAD,SPLIT,PRESV FREE SYRINGE IM   3.  Screening for colon cancer  Z12.11 V76.51 OCCULT BLOOD IMMUNOASSAY,DIAGNOSTIC     Continue current  ewl for mammogram  Fit test  Labs today      Maureen Baugh MD

## 2020-11-05 NOTE — PROGRESS NOTES
Patient who presents for routine immunizations. Patient denies any symptoms , reactions or allergies that would exclude them from being immunized today. Risks and adverse reactions were discussed and the VIS was given to them. All questions were addressed. Patient was observed for10 min post injection. There were no reactions observed.

## 2020-11-05 NOTE — PROGRESS NOTES
Fit kit given to the patient    Patient advised every woman's life will contact her to schedule mammogram    Discharge instructions reviewed with patient    Medication list and understanding of medications reviewed with patient. OTC and herbal medications reviewed and added to med list if applicable  Barriers to adherence assessed. Guidance given regarding new medications this visit, including reason for taking this medicine, and common side effects. AVS given to patient. Explained to patient through her daughter who interpreted .  Patient expressed understanding through her daughter

## 2020-11-05 NOTE — PROGRESS NOTES
Yaneli Velasco presents today for   Chief Complaint   Patient presents with    Diabetes       Is someone accompanying this pt? Yes. Daughter    Is the patient using any DME equipment during [address]? No    Depression Screening:  3 most recent PHQ Screens 11/5/2020   Little interest or pleasure in doing things Several days   Feeling down, depressed, irritable, or hopeless Several days   Total Score PHQ 2 2       Learning Assessment:  Learning Assessment 9/11/2019   PRIMARY LEARNER Patient   PRIMARY LANGUAGE [place] Island[place]   LEARNER PREFERENCE PRIMARY DEMONSTRATION   ANSWERED BY patient   RELATIONSHIP SELF       Abuse Screening:  No flowsheet data found. Fall Risk  No flowsheet data found. Health Maintenance reviewed and discussed and ordered per Provider. Health Maintenance Due   Topic Date Due    Hepatitis C Screening  1965    Pneumococcal 0-64 years (1 of 1 - PPSV23) 10/07/1971    A1C test (Diabetic or Prediabetic)  10/07/1975    Eye Exam Retinal or Dilated  10/07/1975    DTaP/Tdap/Td series (1 - Tdap) 10/07/1986    Shingrix Vaccine Age 50> (1 of 2) 10/07/2015    Breast Cancer Screen Mammogram  10/07/2015    Flu Vaccine (1) 09/01/2020    Colorectal Cancer Screening Combo  10/07/2020   . Coordination of Care:  1. Have you been to the ER, urgent care clinic since your last visit? Hospitalized since your last visit? No    2. Have you seen or consulted any other health care providers outside of the [address] [place] [address] since your last visit? Include any pap smears or colon screening.  Yes

## 2020-11-05 NOTE — PATIENT INSTRUCTIONS
Vaccine Information Statement Influenza (Flu) Vaccine (Inactivated or Recombinant): What You Need to Know Many Vaccine Information Statements are available in Azeri and other languages. See www.immunize.org/vis Hojas de información sobre vacunas están disponibles en español y en muchos otros idiomas. Visite www.immunize.org/vis 1. Why get vaccinated? Influenza vaccine can prevent influenza (flu). Flu is a contagious disease that spreads around the [facility] every year, usually between October and May. Anyone can get the flu, but it is more dangerous for some people. Infants and young children, people 72years of age and older, pregnant women, and people with certain health conditions or a weakened immune system are at greatest risk of flu complications. Pneumonia, bronchitis, sinus infections and ear infections are examples of flu-related complications. If you have a medical condition, such as heart disease, cancer or diabetes, flu can make it worse. Flu can cause fever and chills, sore throat, muscle aches, fatigue, cough, headache, and runny or stuffy nose. Some people may have vomiting and diarrhea, though this is more common in children than adults. Each year thousands of people in the [facility] die from flu, and many more are hospitalized. Flu vaccine prevents millions of illnesses and flu-related visits to the doctor each year. 2. Influenza vaccines CDC recommends everyone 10months of age and older get vaccinated every flu season. Children 6 months through 6years of age may need 2 doses during a single flu season. Everyone else needs only 1 dose each flu season. It takes about 2 weeks for protection to develop after vaccination. There are many flu viruses, and they are always changing. Each year a new flu vaccine is made to protect against three or four viruses that are likely to cause disease in the upcoming flu season.  Even when the vaccine doesnt exactly match these viruses, it may still provide some protection. Influenza vaccine does not cause flu. Influenza vaccine may be given at the same time as other vaccines. 3. Talk with your health care provider Tell your vaccine provider if the person getting the vaccine: 
 Has had an allergic reaction after a previous dose of influenza vaccine, or has any severe, life-threatening allergies.  Has ever had Guillain-Barré Syndrome (also called GBS). In some cases, your health care provider may decide to postpone influenza vaccination to a future visit. People with minor illnesses, such as a cold, may be vaccinated. People who are moderately or severely ill should usually wait until they recover before getting influenza vaccine. Your health care provider can give you more information. 4. Risks of a reaction  Soreness, redness, and swelling where shot is given, fever, muscle aches, and headache can happen after influenza vaccine.  There may be a very small increased risk of Guillain-Barré Syndrome (GBS) after inactivated influenza vaccine (the flu shot). Cathryn Stafford children who get the flu shot along with pneumococcal vaccine (PCV13), and/or DTaP vaccine at the same time might be slightly more likely to have a seizure caused by fever. Tell your health care provider if a child who is getting flu vaccine has ever had a seizure. People sometimes faint after medical procedures, including vaccination. Tell your provider if you feel dizzy or have vision changes or ringing in the ears. As with any medicine, there is a very remote chance of a vaccine causing a severe allergic reaction, other serious injury, or death. 5. What if there is a serious problem? An allergic reaction could occur after the vaccinated person leaves the clinic.  If you see signs of a severe allergic reaction (hives, swelling of the face and throat, difficulty breathing, a fast heartbeat, dizziness, or weakness), call 9-1-1 and get the person to the nearest hospital. 
 
For other signs that concern you, call your health care provider. Adverse reactions should be reported to the Vaccine Adverse Event Reporting System (VAERS). Your health care provider will usually file this report, or you can do it yourself. Visit the VAERS website at www.vaers. hhs.gov or call 2-336.719.6948. VAERS is only for reporting reactions, and VAERS staff do not give medical advice. 6. The National Vaccine Injury Compensation Program 
 
The [facility] Vaccine Injury Compensation Program (VICP) is a federal program that was created to compensate people who may have been injured by certain vaccines. Visit the VICP website at www.hrsa.gov/vaccinecompensation or call 7-998.356.3071 to learn about the program and about filing a claim. There is a time limit to file a claim for compensation. 7. How can I learn more?  Ask your health care provider.  Call your local or state health department.  Contact the Centers for Disease Control and Prevention (CDC): 
- Call 1-142.585.3877 (7-114-HJH-INFO) or 
- Visit CDCs influenza website at www.cdc.gov/flu Vaccine Information Statement (Interim) Inactivated Influenza Vaccine 8/15/2019 
42 BREANN Escobar [id number] Department of Health and "[employer]" Centers for Disease Control and Prevention Office Use Only

## 2020-11-13 ENCOUNTER — TELEPHONE (OUTPATIENT)
Dept: FAMILY MEDICINE CLINIC | Facility: CLINIC | Age: 55
End: 2020-11-13

## 2020-11-13 NOTE — TELEPHONE ENCOUNTER
Client eligibility form and records faxed to [facility] at [phone] to have mammogram scheduled

## 2020-11-19 ENCOUNTER — HOSPITAL ENCOUNTER (OUTPATIENT)
Dept: LAB | Age: 55
Discharge: HOME | End: 2020-11-19

## 2020-11-19 DIAGNOSIS — Z12.11 SCREENING FOR COLON CANCER: ICD-10-CM

## 2020-11-19 PROCEDURE — 82274 ASSAY TEST FOR BLOOD FECAL: CPT

## 2020-11-25 LAB — HEMOCCULT STL QL IA: NEGATIVE

## 2021-03-24 RX ORDER — CYCLOBENZAPRINE HCL 10 MG
TABLET ORAL
Qty: 30 TAB | Refills: 0 | Status: SHIPPED | OUTPATIENT
Start: 2021-03-24 | End: 2021-06-17 | Stop reason: SDUPTHER

## 2021-06-17 ENCOUNTER — OFFICE VISIT (OUTPATIENT)
Dept: FAMILY MEDICINE CLINIC | Facility: CLINIC | Age: 56
End: 2021-06-17

## 2021-06-17 VITALS
HEIGHT: 67 IN | HEART RATE: 71 BPM | RESPIRATION RATE: 16 BRPM | DIASTOLIC BLOOD PRESSURE: 73 MMHG | TEMPERATURE: 97 F | WEIGHT: 173 LBS | SYSTOLIC BLOOD PRESSURE: 121 MMHG | BODY MASS INDEX: 27.15 KG/M2 | OXYGEN SATURATION: 96 %

## 2021-06-17 DIAGNOSIS — E11.9 CONTROLLED TYPE 2 DIABETES MELLITUS WITHOUT COMPLICATION, WITHOUT LONG-TERM CURRENT USE OF INSULIN (HCC): Primary | ICD-10-CM

## 2021-06-17 DIAGNOSIS — M65.339 TRIGGER MIDDLE FINGER, UNSPECIFIED LATERALITY: ICD-10-CM

## 2021-06-17 LAB — HBA1C MFR BLD HPLC: 10.2 %

## 2021-06-17 PROCEDURE — 83036 HEMOGLOBIN GLYCOSYLATED A1C: CPT | Performed by: FAMILY MEDICINE

## 2021-06-17 PROCEDURE — 99213 OFFICE O/P EST LOW 20 MIN: CPT | Performed by: FAMILY MEDICINE

## 2021-06-17 RX ORDER — GLIPIZIDE 10 MG/1
10 TABLET ORAL 2 TIMES DAILY
Qty: 60 TABLET | Refills: 2 | Status: SHIPPED | OUTPATIENT
Start: 2021-06-17 | End: 2022-05-05 | Stop reason: SDUPTHER

## 2021-06-17 RX ORDER — NAPROXEN 500 MG/1
500 TABLET ORAL 2 TIMES DAILY WITH MEALS
Qty: 60 TABLET | Refills: 0 | Status: SHIPPED | OUTPATIENT
Start: 2021-06-17 | End: 2021-08-02

## 2021-06-17 RX ORDER — GLIPIZIDE 5 MG/1
5 TABLET ORAL 2 TIMES DAILY
Qty: 60 TABLET | Refills: 5 | Status: SHIPPED | OUTPATIENT
Start: 2021-06-17 | End: 2021-06-17 | Stop reason: DRUGHIGH

## 2021-06-17 RX ORDER — ATORVASTATIN CALCIUM 40 MG/1
40 TABLET, FILM COATED ORAL DAILY
Qty: 30 TABLET | Refills: 5 | Status: SHIPPED | OUTPATIENT
Start: 2021-06-17 | End: 2022-05-05 | Stop reason: SDUPTHER

## 2021-06-17 RX ORDER — METFORMIN HYDROCHLORIDE 500 MG/1
500 TABLET, EXTENDED RELEASE ORAL 2 TIMES DAILY
Qty: 60 TABLET | Refills: 5 | Status: SHIPPED | OUTPATIENT
Start: 2021-06-17 | End: 2022-05-05 | Stop reason: SDUPTHER

## 2021-06-17 RX ORDER — CYCLOBENZAPRINE HCL 10 MG
TABLET ORAL
Qty: 30 TABLET | Refills: 0 | Status: SHIPPED | OUTPATIENT
Start: 2021-06-17 | End: 2022-05-05 | Stop reason: SDUPTHER

## 2021-06-17 NOTE — PATIENT INSTRUCTIONS
There is a FREE diabetic eye screening this Saturday 6/19 from 12-2pm 
 
[address] [facility] [address] [address] [facility] in to be seen Dedo en gatillo: Instrucciones de cuidado Trigger Finger: Care Instructions Instrucciones de cuidado Un dedo en gatillo (o en resorte) es cuando jennifer de henrique dedos se queda trabado en posición doblada. Por lo general, un dedo en gatillo se endereza por sí solo. El dedo en gatillo puede ser doloroso, areli normalmente no es un problema grave. El dedo en gatillo parece ocurrir más en ciertos grupos de personas. Estos incluyen las personas que tienen diabetes o artritis o que se hill lesionado las elbert en el pasado. Marianela problema también se da en músicos y en personas que empuñan herramientas con frecuencia. El descanso, los ejercicios y otras cosas que puede hacer en el hogar podrían ayudarle a relajar el dedo en gatillo de modo que lo pueda doblar con normalidad. Podrían ponerle venus inyección de corticosteroides. Esta puede reducir la hinchazón y aliviar el dolor. El médico podría ponerle venus tablilla (férula) en el dedo. Esta permitirá que el dedo descanse y evitará que se irrite la articulación. Es posible que necesite cirugía si sigue teniendo el dedo trabado y [place]. La atención de seguimiento es venus parte clave de haney tratamiento y seguridad. Asegúrese de hacer y acudir a todas las citas, y llame a haney médico si está teniendo problemas. También es venus buena idea saber los resultados de henrique exámenes y mantener venus lista de los medicamentos que hay. Cómo puede cuidarse en el hogar? · Si haney médico le puso venus tablilla en el dedo, úsela celeste kyle se lo indicaron. No se la quite hasta que haney médico lo autorice. · Quizás necesite cambiar henrique actividades para evitar movimientos que irriten el dedo. · Si el dedo está hinchado, colóquese hielo o venus compresa fría en el dedo aris 10 a 20 minutos cada vez.  Trate de hacerlo cada 1 a 2 horas aris los siguientes 3 días (cuando esté despierto) o hasta que la hinchazón baje. Póngase un paño roque entre el hielo y la piel. · Eleve la mano sobre venus almohada mientras se aplica hielo, o en cualquier momento en que se siente o se acueste aris los 3 días siguientes. Trate de mantenerla por encima del nivel del corazón. [place] ayudará a reducir la hinchazón. · [place] henrique medicamentos exactamente kyle le fueron recetados. Llame a haney médico si john estar teniendo un problema con haney medicamento. · Pregúntele a haney médico si puede mary un analgésico (medicamento para el dolor) de venta cheryl, kyle acetaminofén (Tylenol), ibuprofeno (Advil, Motrin) o naproxeno (Aleve). Sea primo con los medicamentos. Sonya y siga todas las instrucciones de la [place]. · Si haney médico le recomienda hacer ejercicios, hágalos según las indicaciones. Cuándo debe pedir ayuda? Llame a haney médico ahora mismo o busque atención médica inmediata si: 
  · El dedo se le traba en posición doblada y no se endereza. Preste especial atención a los cambios en haney damion y asegúrese de comunicarse con haney médico si: 
  · No mejora kyle se esperaba. Dónde puede encontrar más información en inglés? Va Boss a http://www.gray.com/ Escriba M826 en la búsqueda para aprender más acerca de \"Dedo en gatillo: Instrucciones de cuidado. \" Revisado: 16 noviembre, 2020               Versión del contenido: 12.8 © 9364-7051 Healthwise, Incorporated. Las instrucciones de cuidado fueron adaptadas bajo licencia por [employer] (which disclaims liability or warranty for this information). Si usted tiene [place] afección médica o sobre estas instrucciones, siempre pregunte a haney profesional de damion. [facility] niega toda garantía o responsabilidad por haney uso de esta información.

## 2021-06-17 NOTE — PROGRESS NOTES
Discharge instructions reviewed with patient      Guidance given regarding new medications this visit, including reason for taking this medicine, and common side effects. AVS given to patient. Explained to patient. Patient expressed understanding. Informed patient to give the office a call in 6 month to schedule an follow up.

## 2021-06-17 NOTE — PROGRESS NOTES
Eye exam    HPI  Kevin Reilly is a 54 y.o. female being seen today for   Chief Complaint   Patient presents with    Follow-up    Medication Refill   .  she states that her sugars at home are 150 or less    She has finger pain and they get stuck. Both hands. She likes to sew and it affects her ability to sew    Past Medical History:   Diagnosis Date    Diabetes ([facility] [address]          ROS  Patient states that she is feeling well. Denies complaints of chest pain, shortness of breath, swelling of legs, dizziness or weakness. she denies nausea, vomiting or diarrhea. Current Outpatient Medications   Medication Sig    cyclobenzaprine (FLEXERIL) 10 mg tablet One po daily for arm pain prn    metFORMIN ER (GLUCOPHAGE XR) 500 mg tablet Take 1 Tablet by mouth two (2) times a day.  glipiZIDE (GLUCOTROL) 5 mg tablet Take 1 Tablet by mouth two (2) times a day.  atorvastatin (LIPITOR) 40 mg tablet Take 1 Tablet by mouth daily.  naproxen (NAPROSYN) 500 mg tablet Take 1 Tablet by mouth two (2) times daily (with meals). No current facility-administered medications for this visit. PE  Visit Vitals  /73 (BP 1 Location: Left upper arm, BP Patient Position: Sitting, BP Cuff Size: Large adult)   Pulse 71   Temp 97 °F (36.1 °C) (Temporal)   Resp 16   Ht 5' 7\" (1.702 m)   Wt 173 lb (78.5 kg)   SpO2 96%   BMI 27.10 kg/m²        Alert and oriented with normal mood and affect. she is well developed and well nourished . Lungs are clear without wheezing. Heart rate is regular without murmurs or gallops. There is no lower extremity edema. Assessment and Plan:        ICD-10-CM ICD-9-CM    1. Controlled type 2 diabetes mellitus without complication, without long-term current use of insulin ([facility])  E11.9 250.00 AMB POC HEMOGLOBIN A1C   2.  Trigger middle finger, unspecified laterality  M65.339 727.03      Try splinting and nsaids for trigger finger  Increase dosage of dm meds  Watch carbs  Follow up in 3 mos for Liban Tse MD

## 2021-08-02 RX ORDER — NAPROXEN 500 MG/1
TABLET ORAL
Qty: 60 TABLET | Refills: 0 | Status: SHIPPED | OUTPATIENT
Start: 2021-08-02

## 2022-03-19 PROBLEM — E11.9 CONTROLLED TYPE 2 DIABETES MELLITUS WITHOUT COMPLICATION, WITHOUT LONG-TERM CURRENT USE OF INSULIN (HCC): Status: ACTIVE | Noted: 2020-01-30

## 2022-05-05 ENCOUNTER — HOSPITAL ENCOUNTER (OUTPATIENT)
Dept: LAB | Age: 57
Discharge: HOME | End: 2022-05-05

## 2022-05-05 ENCOUNTER — OFFICE VISIT (OUTPATIENT)
Dept: FAMILY MEDICINE CLINIC | Facility: CLINIC | Age: 57
End: 2022-05-05

## 2022-05-05 VITALS
BODY MASS INDEX: 25.43 KG/M2 | OXYGEN SATURATION: 97 % | DIASTOLIC BLOOD PRESSURE: 87 MMHG | RESPIRATION RATE: 18 BRPM | HEIGHT: 67 IN | WEIGHT: 162 LBS | SYSTOLIC BLOOD PRESSURE: 147 MMHG | HEART RATE: 65 BPM | TEMPERATURE: 96.5 F

## 2022-05-05 DIAGNOSIS — Z00.00 ROUTINE GENERAL MEDICAL EXAMINATION AT A HEALTH CARE FACILITY: ICD-10-CM

## 2022-05-05 DIAGNOSIS — E11.9 CONTROLLED TYPE 2 DIABETES MELLITUS WITHOUT COMPLICATION, WITHOUT LONG-TERM CURRENT USE OF INSULIN (HCC): ICD-10-CM

## 2022-05-05 DIAGNOSIS — E11.9 CONTROLLED TYPE 2 DIABETES MELLITUS WITHOUT COMPLICATION, WITHOUT LONG-TERM CURRENT USE OF INSULIN (HCC): Primary | ICD-10-CM

## 2022-05-05 DIAGNOSIS — Z12.11 SCREENING FOR COLON CANCER: ICD-10-CM

## 2022-05-05 DIAGNOSIS — T63.481A INSECT STINGS, ACCIDENTAL OR UNINTENTIONAL, INITIAL ENCOUNTER: ICD-10-CM

## 2022-05-05 DIAGNOSIS — M54.6 RIGHT-SIDED THORACIC BACK PAIN, UNSPECIFIED CHRONICITY: ICD-10-CM

## 2022-05-05 LAB
ALBUMIN SERPL-MCNC: 4 G/DL (ref 3.4–5)
ALBUMIN/GLOB SERPL: 1.1 {RATIO} (ref 0.8–1.7)
ALP SERPL-CCNC: 123 U/L (ref 45–117)
ALT SERPL-CCNC: 38 U/L (ref 13–56)
ANION GAP SERPL CALC-SCNC: 5 MMOL/L (ref 3–18)
AST SERPL-CCNC: 24 U/L (ref 10–38)
BILIRUB SERPL-MCNC: 0.6 MG/DL (ref 0.2–1)
BUN SERPL-MCNC: 9 MG/DL (ref 7–18)
BUN/CREAT SERPL: 14 (ref 12–20)
CALCIUM SERPL-MCNC: 9.7 MG/DL (ref 8.5–10.1)
CHLORIDE SERPL-SCNC: 105 MMOL/L (ref 100–111)
CO2 SERPL-SCNC: 29 MMOL/L (ref 21–32)
CREAT SERPL-MCNC: 0.64 MG/DL (ref 0.6–1.3)
CREAT UR-MCNC: 152 MG/DL (ref 30–125)
EST. AVERAGE GLUCOSE BLD GHB EST-MCNC: 266 MG/DL
GLOBULIN SER CALC-MCNC: 3.8 G/DL (ref 2–4)
GLUCOSE SERPL-MCNC: 207 MG/DL (ref 74–99)
HBA1C MFR BLD: 10.9 % (ref 4.2–5.6)
MICROALBUMIN UR-MCNC: 1.46 MG/DL (ref 0–3)
MICROALBUMIN/CREAT UR-RTO: 10 MG/G (ref 0–30)
POTASSIUM SERPL-SCNC: 4.9 MMOL/L (ref 3.5–5.5)
PROT SERPL-MCNC: 7.8 G/DL (ref 6.4–8.2)
SODIUM SERPL-SCNC: 139 MMOL/L (ref 136–145)

## 2022-05-05 PROCEDURE — 86803 HEPATITIS C AB TEST: CPT

## 2022-05-05 PROCEDURE — 82043 UR ALBUMIN QUANTITATIVE: CPT

## 2022-05-05 PROCEDURE — 80053 COMPREHEN METABOLIC PANEL: CPT

## 2022-05-05 PROCEDURE — 36415 COLL VENOUS BLD VENIPUNCTURE: CPT

## 2022-05-05 PROCEDURE — 83036 HEMOGLOBIN GLYCOSYLATED A1C: CPT

## 2022-05-05 PROCEDURE — 3046F HEMOGLOBIN A1C LEVEL >9.0%: CPT | Performed by: FAMILY MEDICINE

## 2022-05-05 PROCEDURE — 99213 OFFICE O/P EST LOW 20 MIN: CPT | Performed by: FAMILY MEDICINE

## 2022-05-05 RX ORDER — CYCLOBENZAPRINE HCL 10 MG
TABLET ORAL
Qty: 30 TABLET | Refills: 0 | Status: SHIPPED | OUTPATIENT
Start: 2022-05-05

## 2022-05-05 RX ORDER — GLIPIZIDE 10 MG/1
10 TABLET ORAL 2 TIMES DAILY
Qty: 60 TABLET | Refills: 5 | Status: SHIPPED | OUTPATIENT
Start: 2022-05-05

## 2022-05-05 RX ORDER — LORATADINE 10 MG/1
10 TABLET ORAL
Qty: 30 TABLET | Refills: 1 | Status: SHIPPED | OUTPATIENT
Start: 2022-05-05

## 2022-05-05 RX ORDER — ATORVASTATIN CALCIUM 40 MG/1
40 TABLET, FILM COATED ORAL DAILY
Qty: 30 TABLET | Refills: 5 | Status: SHIPPED | OUTPATIENT
Start: 2022-05-05

## 2022-05-05 RX ORDER — METFORMIN HYDROCHLORIDE 500 MG/1
500 TABLET, EXTENDED RELEASE ORAL 2 TIMES DAILY
Qty: 60 TABLET | Refills: 5 | Status: SHIPPED | OUTPATIENT
Start: 2022-05-05

## 2022-05-05 NOTE — PROGRESS NOTES
Patient presents for lab draw ordered by:    Ordering Provider: Dr. Brian Hoff Department/Practice:  [address] [facility]  Phone:  [phone]  Date Ordered:  5/5/22    The following labs were drawn and sent to [facility] by Bruna Vail:    CMP, HgA1C and HEP C AB    The following tubes were sent:    Gold  ( 2) and Lavender  ( 1)    Draw site left brachial.  Patient tolerated draw with no distress.

## 2022-05-05 NOTE — PATIENT INSTRUCTIONS
Estiramientos de la espalda: Ejercicios  Back Stretches: Exercises  Instrucciones de cuidado  Aquí se presentan algunos ejemplos de ejercicios para estiramiento de la espalda. Empiece cada ejercicio lentamente. Reduzca la intensidad del ejercicio si Katherene Hali a tener dolor. Haney médico o fisioterapeuta le dirán cuándo puede comenzar con estos ejercicios y cuáles funcionarán mejor para usted. Cómo hacer los ejercicios  Estiramiento sobre la lila    1. Párese cómodamente y separe los pies a la distancia de los hombros.  2. Marissa Kerrie adelante, levante ambos brazos sobre haney Mallika Leupp y trate de alcanzar el techo. No deje que la lila se incline Dana atrás. 3. Mantenga la posición aris 15 a 30 segundos y [employer] a los lados. 4. Repita de 2 a 4 veces. Estiramiento lateral    1. Párese cómodamente y separe los pies a la distancia de los hombros.  2. Alesha Gerald un brazo sobre la lila y luego inclínese hacia el otro lado. 3. Deslice haney mano por la pierna mientras ann que el peso de haney brazo estire suavemente los músculos laterales. Mantenga la posición entre 15 y 27 segundos. 4. Repita de 2 a 4 veces de cada lado. [place]    1. Acuéstese boca abajo, apoyando haney cuerpo con los antebrazos. 2. Yolanda presión con los codos en el piso para elevar la espalda. Al hacer esto, relaje los músculos del estómago y permita que haney espalda se arquee sin usar los músculos de haney espalda. Al hacer presión, evite que henrique caderas o la pelvis se separen del piso. 3. Mantenga la posición aris 15 a 30 segundos y luego relájese. 4. Repita de 2 a 4 veces. Relajamiento y descanso    1. Acuéstese boca arriba con venus toalla enrollada debajo de haney emily y [place] almohada debajo de las rodillas. Extienda los brazos cómodamente a los lados. 2. Relájese y respire con normalidad. 3. Permanezca en esta posición aris unos 10 minutos. 4. Si quiere, puede hacer [employer] 2 y 3 veces al día.   [address] es venus parte clave de haney tratamiento y seguridad. Asegúrese de hacer y acudir a todas las citas, y llame a haney médico si está teniendo problemas. También es venus buena idea saber los resultados de henrique exámenes y mantener venus lista de los medicamentos que hay. ¿Dónde puede encontrar más información en inglés? Vaya a http://www.gray.com/  Molly [id number] en la búsqueda para aprender más acerca de \"Estiramientos de la espalda: Ejercicios. \"  Revisado: 1 julio, 0174               PILOZGV del contenido: 13.2  © 2006-2022 Healthwise, Incorporated. Las instrucciones de cuidado fueron adaptadas bajo licencia por [employer] (which disclaims liability or warranty for this information). Si usted tiene [place] afección médica o sobre estas instrucciones, siempre pregunte a haney profesional de damion. Healthwise, Incorporated niega toda garantía o responsabilidad por haney uso de esta información.

## 2022-05-05 NOTE — PROGRESS NOTES
[facility] 64 [facility] being seen today for   Chief Complaint   Patient presents with    Depression    Rash   . she states that she is out of glipizide x a month. Taking her metformin and lipitor but only taking metformin one time a day because it hurts her stomach. Per pt sugars are in the 100s when she checks which is not that often. Bit by something a few weeks ago on chest.  After the bite she developed some red splotches on body. Still has a red spot on her abdomen. Does not itch. She put a cream on it and it did go away. She does not think she was stung again. Has tried advil and an antibioitc cream.     Also has back pain x a few weeks. Worse with activity. Left flank//upper back. No injury. Past Medical History:   Diagnosis Date    Diabetes ([facility] [address]          ROS  Patient states that she is feeling well. Denies complaints of chest pain, shortness of breath, swelling of legs, dizziness or weakness. she denies nausea, vomiting or diarrhea. Current Outpatient Medications   Medication Sig    metFORMIN ER (GLUCOPHAGE XR) 500 mg tablet Take 1 Tablet by mouth two (2) times a day.  atorvastatin (LIPITOR) 40 mg tablet Take 1 Tablet by mouth daily.  glipiZIDE (GLUCOTROL) 10 mg tablet Take 1 Tablet by mouth two (2) times a day.  loratadine (CLARITIN) 10 mg tablet Take 1 Tablet by mouth daily as needed for Itching.  cyclobenzaprine (FLEXERIL) 10 mg tablet One po daily for muscle pain prn    naproxen (NAPROSYN) 500 mg tablet TAKE 1 TABLET BY MOUTH TWICE DAILY WITH MEALS     No current facility-administered medications for this visit. PE  Visit Vitals  BP (!) 147/87 (BP 1 Location: Left upper arm, BP Patient Position: Sitting, BP Cuff Size: Adult)   Pulse 65   Temp (!) 96.5 °F (35.8 °C) (Temporal)   Resp 18   Ht 5' 7\" (1.702 m)   Wt 162 lb (73.5 kg)   SpO2 97%   BMI 25.37 kg/m²        Alert and oriented with normal mood and affect.  she is well developed and well nourished . Lungs are clear without wheezing. Heart rate is regular without murmurs or gallops. There is no lower extremity edema. On right abdomen there is a ill defined red splotch with no wamth or tenderness. Right upper back with tenderness. Results for orders placed or performed in visit on 06/17/21   AMB POC HEMOGLOBIN A1C   Result Value Ref Range    Hemoglobin A1c (POC) 10.2 %         Assessment and Plan:        ICD-10-CM ICD-9-CM    1. Controlled type 2 diabetes mellitus without complication, without long-term current use of insulin (HCC)  E11.9 250.00 HEMOGLOBIN A1C WITH EAG      METABOLIC PANEL, COMPREHENSIVE      MICROALBUMIN, UR, RAND W/ MICROALB/CREAT RATIO   2. Screening for colon cancer  Z12.11 V76.51 HEPATITIS C AB      OCCULT BLOOD IMMUNOASSAY,DIAGNOSTIC   3. Routine general medical examination at a health care facility  Z00.00 V70.0    4. Insect stings, accidental or unintentional, initial encounter  T63.481A 989.5      E905.5    5. Right-sided thoracic back pain, unspecified chronicity  M54.6 724.1      Check a1c today. Likely will be high. Restart meds. Even if she cannot tolerate full dose of metformin she can add back glipizide bid    Antihistamine for possible allergic reaction to insect bite/sting    Back stretches for muscle strain.        Dawna Molina MD

## 2022-05-06 LAB
HCV AB SER IA-ACNC: 0.07 INDEX
HCV AB SERPL QL IA: NEGATIVE
HCV COMMENT,HCGAC: NORMAL

## 2022-08-12 ENCOUNTER — TELEPHONE (OUTPATIENT)
Dept: FAMILY MEDICINE CLINIC | Facility: CLINIC | Age: 57
End: 2022-08-12

## 2022-08-12 NOTE — TELEPHONE ENCOUNTER
----- Message from Karl De Leon MD sent at 8/9/2022  9:13 AM EDT -----  Regarding: MEENA woo  This nice pt is due for bp and diabetes follow up.    thx

## 2022-08-12 NOTE — TELEPHONE ENCOUNTER
Attempted to reach out to patient to schedule an follow up visit. Left patient a voicemail stating to return the office phone call to schedule an follow up.

## 2022-08-12 NOTE — LETTER
10/26/2022 9:58 AM    Ms. Corinna Elaine [id number]      Dear Ms. Heber Cook Knows missed you! Please call our office at [phone] and schedule a follow up appointment for your continued care.         Sincerely,      Naomi Deleon

## 2022-10-14 NOTE — TELEPHONE ENCOUNTER
2nd attempt to reach out to patient to schedule an follow up visit. Left patient a voicemail stating to return the office phone call.

## 2022-10-26 NOTE — TELEPHONE ENCOUNTER
3rd attempt to reach out to patient using [employer]  services. Voicemail left by  stating to return office phone call. Attempted to reach out to patient on home number using [employer]  services and voicemail has not been setup. Follow up letter mailed out to patient.

## 2023-05-04 ENCOUNTER — OFFICE VISIT (OUTPATIENT)
Age: 58
End: 2023-05-04

## 2023-05-04 ENCOUNTER — HOSPITAL ENCOUNTER (OUTPATIENT)
Facility: HOSPITAL | Age: 58
Setting detail: SPECIMEN
Discharge: HOME | End: 2023-05-07

## 2023-05-04 VITALS
HEART RATE: 75 BPM | BODY MASS INDEX: 25.11 KG/M2 | HEIGHT: 67 IN | TEMPERATURE: 97.1 F | DIASTOLIC BLOOD PRESSURE: 88 MMHG | SYSTOLIC BLOOD PRESSURE: 146 MMHG | RESPIRATION RATE: 20 BRPM | OXYGEN SATURATION: 98 % | WEIGHT: 160 LBS

## 2023-05-04 DIAGNOSIS — E11.9 TYPE 2 DIABETES MELLITUS WITHOUT COMPLICATION, WITHOUT LONG-TERM CURRENT USE OF INSULIN (HCC): Primary | ICD-10-CM

## 2023-05-04 DIAGNOSIS — L02.411 ABSCESS OF RIGHT AXILLA: ICD-10-CM

## 2023-05-04 DIAGNOSIS — E11.9 TYPE 2 DIABETES MELLITUS WITHOUT COMPLICATION, WITHOUT LONG-TERM CURRENT USE OF INSULIN (HCC): ICD-10-CM

## 2023-05-04 LAB
ALBUMIN SERPL-MCNC: 3.8 G/DL (ref 3.4–5)
ALBUMIN/GLOB SERPL: 1 (ref 0.8–1.7)
ALP SERPL-CCNC: 112 U/L (ref 45–117)
ALT SERPL-CCNC: 60 U/L (ref 13–56)
ANION GAP SERPL CALC-SCNC: 6 MMOL/L (ref 3–18)
AST SERPL-CCNC: 38 U/L (ref 10–38)
BILIRUB SERPL-MCNC: 0.5 MG/DL (ref 0.2–1)
BUN SERPL-MCNC: 11 MG/DL (ref 7–18)
BUN/CREAT SERPL: 19 (ref 12–20)
CALCIUM SERPL-MCNC: 9.4 MG/DL (ref 8.5–10.1)
CHLORIDE SERPL-SCNC: 107 MMOL/L (ref 100–111)
CHOLEST SERPL-MCNC: 178 MG/DL
CO2 SERPL-SCNC: 28 MMOL/L (ref 21–32)
CREAT SERPL-MCNC: 0.58 MG/DL (ref 0.6–1.3)
EST. AVERAGE GLUCOSE BLD GHB EST-MCNC: 255 MG/DL
GLOBULIN SER CALC-MCNC: 3.9 G/DL (ref 2–4)
GLUCOSE SERPL-MCNC: 157 MG/DL (ref 74–99)
HBA1C MFR BLD: 10.5 % (ref 4.2–5.6)
HDLC SERPL-MCNC: 58 MG/DL (ref 40–60)
HDLC SERPL: 3.1 (ref 0–5)
LDLC SERPL CALC-MCNC: 100 MG/DL (ref 0–100)
LIPID PANEL: NORMAL
POTASSIUM SERPL-SCNC: 4.1 MMOL/L (ref 3.5–5.5)
PROT SERPL-MCNC: 7.7 G/DL (ref 6.4–8.2)
SODIUM SERPL-SCNC: 141 MMOL/L (ref 136–145)
TRIGL SERPL-MCNC: 100 MG/DL
VLDLC SERPL CALC-MCNC: 20 MG/DL

## 2023-05-04 PROCEDURE — 36415 COLL VENOUS BLD VENIPUNCTURE: CPT

## 2023-05-04 PROCEDURE — 80061 LIPID PANEL: CPT

## 2023-05-04 PROCEDURE — 99213 OFFICE O/P EST LOW 20 MIN: CPT | Performed by: FAMILY MEDICINE

## 2023-05-04 PROCEDURE — 3046F HEMOGLOBIN A1C LEVEL >9.0%: CPT | Performed by: FAMILY MEDICINE

## 2023-05-04 PROCEDURE — 80053 COMPREHEN METABOLIC PANEL: CPT

## 2023-05-04 PROCEDURE — 83036 HEMOGLOBIN GLYCOSYLATED A1C: CPT

## 2023-05-04 RX ORDER — CEPHALEXIN 500 MG/1
500 CAPSULE ORAL 3 TIMES DAILY
Qty: 30 CAPSULE | Refills: 0 | Status: SHIPPED | OUTPATIENT
Start: 2023-05-04 | End: 2023-05-14

## 2023-05-04 RX ORDER — ATORVASTATIN CALCIUM 40 MG/1
40 TABLET, FILM COATED ORAL DAILY
Qty: 90 TABLET | Refills: 1 | Status: SHIPPED | OUTPATIENT
Start: 2023-05-04

## 2023-05-04 RX ORDER — GLIPIZIDE 10 MG/1
10 TABLET ORAL 2 TIMES DAILY
Qty: 180 TABLET | Refills: 1 | Status: SHIPPED | OUTPATIENT
Start: 2023-05-04

## 2023-05-04 RX ORDER — METFORMIN HYDROCHLORIDE 500 MG/1
500 TABLET, EXTENDED RELEASE ORAL 2 TIMES DAILY
Qty: 90 TABLET | Refills: 1 | Status: SHIPPED | OUTPATIENT
Start: 2023-05-04 | End: 2023-05-07 | Stop reason: DRUGHIGH

## 2023-05-04 NOTE — PROGRESS NOTES
NY Anthony is a 62 y.o. female being seen today for   Chief Complaint   Patient presents with    Blister     Under right armpit. Pt stated she was told by a doctor to put something warm on it but it became bigger over time   . she states that she has a swollen spot under her right armpit x   She was seen somewhere and told to use warm compresses. Since that time, she did have some drainage. It drained in the night, pus and blood. This was yesterday and it feels a little bit better. She states she has all her medicines for diabetes and cholesterol. She checks her blood sugar about 2x a week and her sugars are in the 100s. +   Мария Morillo via [employer]. Past Medical History:   Diagnosis Date    Diabetes ([address]          ROS  Patient states that she is feeling well. Denies complaints of chest pain, shortness of breath, swelling of legs, dizziness or weakness. she denies nausea, vomiting or diarrhea. Current Outpatient Medications   Medication Sig    glipiZIDE (GLUCOTROL) 10 MG tablet Take 1 tablet by mouth 2 times daily    atorvastatin (LIPITOR) 40 MG tablet Take 1 tablet by mouth daily    metFORMIN (GLUCOPHAGE-XR) 500 MG extended release tablet Take 1 tablet by mouth 2 times daily    cephALEXin (KEFLEX) 500 MG capsule Take 1 capsule by mouth 3 times daily for 10 days    cyclobenzaprine (FLEXERIL) 10 MG tablet One po daily for muscle pain prn    loratadine (CLARITIN) 10 MG tablet Take 1 tablet by mouth daily as needed    naproxen (NAPROSYN) 500 MG tablet TAKE 1 TABLET BY MOUTH TWICE DAILY WITH MEALS     No current facility-administered medications for this visit. PE  BP (!) 146/88 (Site: Left Upper Arm, Position: Sitting, Cuff Size: Medium Adult)   Pulse 75   Temp 97.1 °F (36.2 °C) (Temporal)   Resp 20   Ht 5' 7\" (1.702 m)   Wt 160 lb (72.6 kg)   SpO2 98%   BMI 25.06 kg/m²      Alert and oriented with normal mood and affect.  she is well developed

## 2023-05-04 NOTE — PROGRESS NOTES
Patient presents for lab draw ordered by:    Ordering Provider:  Jarvis Vera  Ordering Department/Practice:  [facility]  Phone:  [phone]  Date Ordered:  5/4/23    The following labs were drawn and sent to [address] by Vivian Kim LPN:    Lipid CMP [id number]    The following tubes were sent:    1 Gold 1 Lav    Draw site right brachial.  Patient tolerated draw with no distress. Discharge instructions reviewed with patient via  Adriane Parish)    Medication list and understanding of medications reviewed with patient via  Adriane Parish). OTC and herbal medications reviewed and added to med list if applicable  Barriers to adherence assessed. Guidance given regarding new medications this visit, including reason for taking this medicine, and elizabeth  (kelly)mmon side effects. AVS given to patient. Explained to patient via  Adriane Parish). Patient expressed understanding .

## 2023-05-07 RX ORDER — METFORMIN HYDROCHLORIDE 750 MG/1
750 TABLET, EXTENDED RELEASE ORAL
Qty: 90 TABLET | Refills: 1 | Status: SHIPPED | OUTPATIENT
Start: 2023-05-07

## 2023-08-15 ENCOUNTER — TELEPHONE (OUTPATIENT)
Age: 58
End: 2023-08-15

## 2023-08-15 NOTE — TELEPHONE ENCOUNTER
Attempted to reach out to patient using [employer]  services.  left a voicemail for patient stating to return the office phone call along with the office number. BP check. Medication reconciled. See progress note. Please advise

## 2023-09-27 NOTE — TELEPHONE ENCOUNTER
----- Message from Digna Mendez MD sent at 9/25/2023  9:27 PM EDT -----  Regarding: follwo up diabtes  This nice pt is due for follow up diabetes  Thank you

## 2023-09-27 NOTE — TELEPHONE ENCOUNTER
2nd attempt to reach out to patient using [employer] . Voicemail left by  stating to return the office phone call. Follow up letter mailed out to patient along with clinic calendar for October.

## 2023-11-16 ENCOUNTER — OFFICE VISIT (OUTPATIENT)
Age: 58
End: 2023-11-16

## 2023-11-16 ENCOUNTER — HOSPITAL ENCOUNTER (OUTPATIENT)
Facility: HOSPITAL | Age: 58
Setting detail: SPECIMEN
Discharge: HOME | End: 2023-11-19

## 2023-11-16 VITALS
RESPIRATION RATE: 14 BRPM | HEIGHT: 67 IN | WEIGHT: 159 LBS | DIASTOLIC BLOOD PRESSURE: 83 MMHG | HEART RATE: 87 BPM | TEMPERATURE: 97.6 F | SYSTOLIC BLOOD PRESSURE: 135 MMHG | OXYGEN SATURATION: 95 % | BODY MASS INDEX: 24.96 KG/M2

## 2023-11-16 DIAGNOSIS — M54.6 ACUTE BILATERAL THORACIC BACK PAIN: ICD-10-CM

## 2023-11-16 DIAGNOSIS — E11.9 TYPE 2 DIABETES MELLITUS WITHOUT COMPLICATION, WITHOUT LONG-TERM CURRENT USE OF INSULIN (HCC): ICD-10-CM

## 2023-11-16 DIAGNOSIS — Z23 NEEDS FLU SHOT: ICD-10-CM

## 2023-11-16 DIAGNOSIS — E11.9 TYPE 2 DIABETES MELLITUS WITHOUT COMPLICATION, WITHOUT LONG-TERM CURRENT USE OF INSULIN (HCC): Primary | ICD-10-CM

## 2023-11-16 LAB
ALBUMIN SERPL-MCNC: 3.6 G/DL (ref 3.4–5)
ALBUMIN/GLOB SERPL: 1 (ref 0.8–1.7)
ALP SERPL-CCNC: 122 U/L (ref 45–117)
ALT SERPL-CCNC: 51 U/L (ref 13–56)
ANION GAP SERPL CALC-SCNC: 4 MMOL/L (ref 3–18)
AST SERPL-CCNC: 33 U/L (ref 10–38)
BILIRUB SERPL-MCNC: 0.5 MG/DL (ref 0.2–1)
BUN SERPL-MCNC: 9 MG/DL (ref 7–18)
BUN/CREAT SERPL: 15 (ref 12–20)
CALCIUM SERPL-MCNC: 9.1 MG/DL (ref 8.5–10.1)
CHLORIDE SERPL-SCNC: 105 MMOL/L (ref 100–111)
CO2 SERPL-SCNC: 29 MMOL/L (ref 21–32)
CREAT SERPL-MCNC: 0.61 MG/DL (ref 0.6–1.3)
CREAT UR-MCNC: 70 MG/DL (ref 30–125)
EST. AVERAGE GLUCOSE BLD GHB EST-MCNC: 223 MG/DL
GLOBULIN SER CALC-MCNC: 3.7 G/DL (ref 2–4)
GLUCOSE SERPL-MCNC: 299 MG/DL (ref 74–99)
HBA1C MFR BLD: 9.4 % (ref 4.2–5.6)
MICROALBUMIN UR-MCNC: 0.72 MG/DL (ref 0–3)
MICROALBUMIN/CREAT UR-RTO: 10 MG/G (ref 0–30)
POTASSIUM SERPL-SCNC: 3.7 MMOL/L (ref 3.5–5.5)
PROT SERPL-MCNC: 7.3 G/DL (ref 6.4–8.2)
SODIUM SERPL-SCNC: 138 MMOL/L (ref 136–145)

## 2023-11-16 PROCEDURE — 80053 COMPREHEN METABOLIC PANEL: CPT

## 2023-11-16 PROCEDURE — 3046F HEMOGLOBIN A1C LEVEL >9.0%: CPT | Performed by: NURSE PRACTITIONER

## 2023-11-16 PROCEDURE — 82570 ASSAY OF URINE CREATININE: CPT

## 2023-11-16 PROCEDURE — 90471 IMMUNIZATION ADMIN: CPT | Performed by: NURSE PRACTITIONER

## 2023-11-16 PROCEDURE — 99213 OFFICE O/P EST LOW 20 MIN: CPT | Performed by: NURSE PRACTITIONER

## 2023-11-16 PROCEDURE — 90686 IIV4 VACC NO PRSV 0.5 ML IM: CPT | Performed by: NURSE PRACTITIONER

## 2023-11-16 PROCEDURE — 82043 UR ALBUMIN QUANTITATIVE: CPT

## 2023-11-16 PROCEDURE — 83036 HEMOGLOBIN GLYCOSYLATED A1C: CPT

## 2023-11-16 PROCEDURE — 36415 COLL VENOUS BLD VENIPUNCTURE: CPT

## 2023-11-16 RX ORDER — GLIPIZIDE 10 MG/1
10 TABLET ORAL 2 TIMES DAILY
Qty: 180 TABLET | Refills: 1 | Status: SHIPPED | OUTPATIENT
Start: 2023-11-16

## 2023-11-16 RX ORDER — METFORMIN HYDROCHLORIDE 750 MG/1
750 TABLET, EXTENDED RELEASE ORAL
Qty: 90 TABLET | Refills: 1 | Status: SHIPPED | OUTPATIENT
Start: 2023-11-16

## 2023-11-16 RX ORDER — CYCLOBENZAPRINE HCL 10 MG
5 TABLET ORAL 3 TIMES DAILY PRN
Qty: 30 TABLET | Refills: 1 | Status: SHIPPED | OUTPATIENT
Start: 2023-11-16

## 2023-11-16 RX ORDER — ATORVASTATIN CALCIUM 40 MG/1
40 TABLET, FILM COATED ORAL DAILY
Qty: 90 TABLET | Refills: 1 | Status: SHIPPED | OUTPATIENT
Start: 2023-11-16

## 2023-11-16 NOTE — PROGRESS NOTES
Patient who presents for routine immunizations. Patient denies any symptoms , reactions or allergies that would exclude them from being immunized today. Risks and adverse reactions were discussed and the VIS was given to them. All questions were addressed. Patient was observed for 10 min post injection. There were no reactions observed. Patient presents for lab draw ordered by:    Arturo Khan presents for lab draw ordered by:    Ordering Provider:  Mckenzie Daniels  Ordering Department/Practice:  [facility] [address]  Phone:  [phone]  Date Ordered:  11/16/24    The following labs were drawn and sent to [facility] by Danielle Garzon LPN:    CBC [id number]    The following tubes were sent:    1 gold 1 lav    Draw site left brachial.  Patient tolerated draw with no distress. Diabetic foot exam performed by Danielle Garzon LPN      Measurement  Response Nurse Comment Physician Comment   Monofilament  R - 6/6  L - 6/6     Pulse DP R - present  L - present     Pulse TP R - present  L - present     Structural deformity R - None  L - None     Skin Integrity / Deformity R - None  L - None        Reviewed by:           Discharge instructions reviewed with patient via  (daughter)    Medication list and understanding of medications reviewed with patient via  (daughter). OTC and herbal medications reviewed and added to med list if applicable  Barriers to adherence assessed. Guidance given regarding new medications this visit, including reason for taking this medicine, and common side effects. AVS given to patient. Explained to patient via  (daughter). Patient expressed understanding via  (daughter) .

## 2023-11-17 ENCOUNTER — TELEPHONE (OUTPATIENT)
Age: 58
End: 2023-11-17

## 2023-11-17 NOTE — TELEPHONE ENCOUNTER
Phone call to patient's daughter.  Follow up appointment scheduled to discuss lab findings 12/7/23 at 10:45

## 2023-12-07 ENCOUNTER — OFFICE VISIT (OUTPATIENT)
Age: 58
End: 2023-12-07

## 2023-12-07 VITALS
TEMPERATURE: 97 F | BODY MASS INDEX: 24.48 KG/M2 | HEIGHT: 67 IN | DIASTOLIC BLOOD PRESSURE: 78 MMHG | RESPIRATION RATE: 20 BRPM | HEART RATE: 65 BPM | WEIGHT: 156 LBS | OXYGEN SATURATION: 97 % | SYSTOLIC BLOOD PRESSURE: 127 MMHG

## 2023-12-07 DIAGNOSIS — E11.9 TYPE 2 DIABETES MELLITUS WITHOUT COMPLICATION, WITHOUT LONG-TERM CURRENT USE OF INSULIN (HCC): ICD-10-CM

## 2023-12-07 PROCEDURE — 99213 OFFICE O/P EST LOW 20 MIN: CPT | Performed by: NURSE PRACTITIONER

## 2023-12-07 PROCEDURE — 3046F HEMOGLOBIN A1C LEVEL >9.0%: CPT | Performed by: NURSE PRACTITIONER

## 2023-12-08 NOTE — PROGRESS NOTES
Discharge instructions reviewed with patient via  (daughter)    Medication list and understanding of medications reviewed with patient via  (daughter). OTC and herbal medications reviewed and added to med list if applicable  Barriers to adherence assessed. Guidance given regarding new medications this visit, including reason for taking this medicine, and common side effects. AVS given to patient. Explained to patient via  (daughter). Patient expressed understanding via  (daughter).
I have reviewed the attatched progress note and I agree with the plan and medical decision making as outlined by Anastacio Tariq MD
Metabolic Panel    Collection Time: 11/16/23 11:33 AM   Result Value Ref Range    Sodium 138 136 - 145 mmol/L    Potassium 3.7 3.5 - 5.5 mmol/L    Chloride 105 100 - 111 mmol/L    CO2 29 21 - 32 mmol/L    Anion Gap 4 3.0 - 18 mmol/L    Glucose 299 (H) 74 - 99 mg/dL    BUN 9 7.0 - 18 MG/DL    Creatinine 0.61 0.6 - 1.3 MG/DL    Bun/Cre Ratio 15 12 - 20      Est, Glom Filt Rate >60 >60 ml/min/1.73m2    Calcium 9.1 8.5 - 10.1 MG/DL    Total Bilirubin 0.5 0.2 - 1.0 MG/DL    ALT 51 13 - 56 U/L    AST 33 10 - 38 U/L    Alk Phosphatase 122 (H) 45 - 117 U/L    Total Protein 7.3 6.4 - 8.2 g/dL    Albumin 3.6 3.4 - 5.0 g/dL    Globulin 3.7 2.0 - 4.0 g/dL    Albumin/Globulin Ratio 1.0 0.8 - 1.7     Microalbumin / Creatinine Urine Ratio    Collection Time: 11/16/23 11:33 AM   Result Value Ref Range    Microalbumin, Random Urine 0.72 0 - 3.0 MG/DL    Creatinine, Ur 70.00 30 - 125 mg/dL    Microalbumin Creatinine Ratio 10 0 - 30 mg/g      Assessment and Plan:       Diagnosis Orders   1. Type 2 diabetes mellitus without complication, without long-term current use of insulin ([address])          HA1C not at goal. Discussed potential complications of poorly controlled T2DM. Encouraged med compliance, diet, lifestyle changes. Will add Karen. PAP paperwork given today. Follow up in 3 mos.       Jeanette Jean, APRN - NP

## 2024-05-22 ENCOUNTER — TELEPHONE (OUTPATIENT)
Age: 59
End: 2024-05-22

## 2024-05-22 NOTE — TELEPHONE ENCOUNTER
----- Message from Violeta Patel MD sent at 5/21/2024  9:37 AM EDT -----  Regarding: daibetes follow up  Can you schedule this nice pt for diabetes follow up?

## 2024-05-23 ENCOUNTER — HOSPITAL ENCOUNTER (OUTPATIENT)
Facility: HOSPITAL | Age: 59
Setting detail: SPECIMEN
Discharge: HOME | End: 2024-05-26

## 2024-05-23 ENCOUNTER — OFFICE VISIT (OUTPATIENT)
Age: 59
End: 2024-05-23

## 2024-05-23 VITALS
OXYGEN SATURATION: 97 % | RESPIRATION RATE: 16 BRPM | WEIGHT: 154 LBS | DIASTOLIC BLOOD PRESSURE: 82 MMHG | BODY MASS INDEX: 24.17 KG/M2 | SYSTOLIC BLOOD PRESSURE: 138 MMHG | TEMPERATURE: 97.5 F | HEIGHT: 67 IN | HEART RATE: 75 BPM

## 2024-05-23 DIAGNOSIS — J30.2 SEASONAL ALLERGIES: ICD-10-CM

## 2024-05-23 DIAGNOSIS — E11.9 TYPE 2 DIABETES MELLITUS WITHOUT COMPLICATION, WITHOUT LONG-TERM CURRENT USE OF INSULIN (HCC): Primary | ICD-10-CM

## 2024-05-23 DIAGNOSIS — E11.9 TYPE 2 DIABETES MELLITUS WITHOUT COMPLICATION, WITHOUT LONG-TERM CURRENT USE OF INSULIN (HCC): ICD-10-CM

## 2024-05-23 LAB
ALBUMIN SERPL-MCNC: 3.7 G/DL (ref 3.4–5)
ALBUMIN/GLOB SERPL: 0.9 (ref 0.8–1.7)
ALP SERPL-CCNC: 126 U/L (ref 45–117)
ALT SERPL-CCNC: 32 U/L (ref 13–56)
ANION GAP SERPL CALC-SCNC: 2 MMOL/L (ref 3–18)
AST SERPL-CCNC: 18 U/L (ref 10–38)
BASOPHILS # BLD: 0 K/UL (ref 0–0.1)
BASOPHILS NFR BLD: 1 % (ref 0–2)
BILIRUB SERPL-MCNC: 0.5 MG/DL (ref 0.2–1)
BUN SERPL-MCNC: 12 MG/DL (ref 7–18)
BUN/CREAT SERPL: 18 (ref 12–20)
CALCIUM SERPL-MCNC: 9.5 MG/DL (ref 8.5–10.1)
CHLORIDE SERPL-SCNC: 105 MMOL/L (ref 100–111)
CHOLEST SERPL-MCNC: 214 MG/DL
CO2 SERPL-SCNC: 30 MMOL/L (ref 21–32)
CREAT SERPL-MCNC: 0.67 MG/DL (ref 0.6–1.3)
CREAT UR-MCNC: 280 MG/DL (ref 30–125)
DIFFERENTIAL METHOD BLD: NORMAL
EOSINOPHIL # BLD: 0 K/UL (ref 0–0.4)
EOSINOPHIL NFR BLD: 1 % (ref 0–5)
ERYTHROCYTE [DISTWIDTH] IN BLOOD BY AUTOMATED COUNT: 12.9 % (ref 11.6–14.5)
EST. AVERAGE GLUCOSE BLD GHB EST-MCNC: 255 MG/DL
GLOBULIN SER CALC-MCNC: 4.2 G/DL (ref 2–4)
GLUCOSE SERPL-MCNC: 266 MG/DL (ref 74–99)
HBA1C MFR BLD: 10.5 % (ref 4.2–5.6)
HCT VFR BLD AUTO: 39.6 % (ref 35–45)
HDLC SERPL-MCNC: 63 MG/DL (ref 40–60)
HDLC SERPL: 3.4 (ref 0–5)
HGB BLD-MCNC: 13.3 G/DL (ref 12–16)
IMM GRANULOCYTES # BLD AUTO: 0 K/UL (ref 0–0.04)
IMM GRANULOCYTES NFR BLD AUTO: 0 % (ref 0–0.5)
LDLC SERPL CALC-MCNC: 133 MG/DL (ref 0–100)
LIPID PANEL: ABNORMAL
LYMPHOCYTES # BLD: 2 K/UL (ref 0.9–3.6)
LYMPHOCYTES NFR BLD: 38 % (ref 21–52)
MCH RBC QN AUTO: 30.2 PG (ref 24–34)
MCHC RBC AUTO-ENTMCNC: 33.6 G/DL (ref 31–37)
MCV RBC AUTO: 89.8 FL (ref 78–100)
MICROALBUMIN UR-MCNC: 3.45 MG/DL (ref 0–3)
MICROALBUMIN/CREAT UR-RTO: 12 MG/G (ref 0–30)
MONOCYTES # BLD: 0.3 K/UL (ref 0.05–1.2)
MONOCYTES NFR BLD: 6 % (ref 3–10)
NEUTS SEG # BLD: 2.9 K/UL (ref 1.8–8)
NEUTS SEG NFR BLD: 55 % (ref 40–73)
NRBC # BLD: 0 K/UL (ref 0–0.01)
NRBC BLD-RTO: 0 PER 100 WBC
PLATELET # BLD AUTO: 235 K/UL (ref 135–420)
PMV BLD AUTO: 11.1 FL (ref 9.2–11.8)
POTASSIUM SERPL-SCNC: 4.3 MMOL/L (ref 3.5–5.5)
PROT SERPL-MCNC: 7.9 G/DL (ref 6.4–8.2)
RBC # BLD AUTO: 4.41 M/UL (ref 4.2–5.3)
SODIUM SERPL-SCNC: 137 MMOL/L (ref 136–145)
TRIGL SERPL-MCNC: 90 MG/DL
VLDLC SERPL CALC-MCNC: 18 MG/DL
WBC # BLD AUTO: 5.2 K/UL (ref 4.6–13.2)

## 2024-05-23 PROCEDURE — 83036 HEMOGLOBIN GLYCOSYLATED A1C: CPT

## 2024-05-23 PROCEDURE — 82043 UR ALBUMIN QUANTITATIVE: CPT

## 2024-05-23 PROCEDURE — 85025 COMPLETE CBC W/AUTO DIFF WBC: CPT

## 2024-05-23 PROCEDURE — 80061 LIPID PANEL: CPT

## 2024-05-23 PROCEDURE — 80053 COMPREHEN METABOLIC PANEL: CPT

## 2024-05-23 PROCEDURE — 82570 ASSAY OF URINE CREATININE: CPT

## 2024-05-23 PROCEDURE — 99213 OFFICE O/P EST LOW 20 MIN: CPT | Performed by: NURSE PRACTITIONER

## 2024-05-23 RX ORDER — ATORVASTATIN CALCIUM 40 MG/1
40 TABLET, FILM COATED ORAL DAILY
Qty: 90 TABLET | Refills: 1 | Status: SHIPPED | OUTPATIENT
Start: 2024-05-23

## 2024-05-23 RX ORDER — GLIPIZIDE 10 MG/1
10 TABLET ORAL 2 TIMES DAILY
Qty: 180 TABLET | Refills: 1 | Status: SHIPPED | OUTPATIENT
Start: 2024-05-23

## 2024-05-23 RX ORDER — LORATADINE 10 MG/1
10 TABLET ORAL DAILY PRN
Qty: 30 TABLET | Refills: 3 | Status: SHIPPED | OUTPATIENT
Start: 2024-05-23

## 2024-05-23 RX ORDER — METFORMIN HYDROCHLORIDE 750 MG/1
750 TABLET, EXTENDED RELEASE ORAL
Qty: 90 TABLET | Refills: 1 | Status: SHIPPED | OUTPATIENT
Start: 2024-05-23

## 2024-05-23 ASSESSMENT — PATIENT HEALTH QUESTIONNAIRE - PHQ9
2. FEELING DOWN, DEPRESSED OR HOPELESS: SEVERAL DAYS
SUM OF ALL RESPONSES TO PHQ QUESTIONS 1-9: 2
1. LITTLE INTEREST OR PLEASURE IN DOING THINGS: SEVERAL DAYS
SUM OF ALL RESPONSES TO PHQ9 QUESTIONS 1 & 2: 2
SUM OF ALL RESPONSES TO PHQ QUESTIONS 1-9: 2

## 2024-05-23 NOTE — PROGRESS NOTES
I have reviewed the attatched progress note and I agree with the plan and medical decision making as outlined by ARBEN Perez MD

## 2024-05-23 NOTE — PROGRESS NOTES
NY Nuñez Clement Castellon is a 58 y.o. female being seen today for   Chief Complaint   Patient presents with    Diabetes    Headache     Patient is here for DM follow up. Here with daughter who serves as .    Has not started Farxiga. Did not complete necessary paperwork yet. Compliant with other meds. Reports feeling well except for occasional headache and runny nose she thinks is related to pollen. Ran out of Claritin for past few weeks and symptoms have been worse.  Checks blood sugars at home. Averages around 110-130s.   Reports occ blurry vision. No recent eye exam.    Past Medical History:   Diagnosis Date    Diabetes (HCC)          ROS  Patient states that she is feeling well. Denies complaints of chest pain, shortness of breath, swelling of legs, dizziness or weakness. she denies nausea, vomiting or diarrhea.        Current Outpatient Medications   Medication Sig    IBUPROFEN PO Take by mouth    loratadine (CLARITIN) 10 MG tablet Take 1 tablet by mouth daily as needed (allergy symptoms)    metFORMIN (GLUCOPHAGE-XR) 750 MG extended release tablet Take 1 tablet by mouth daily (with breakfast)    glipiZIDE (GLUCOTROL) 10 MG tablet Take 1 tablet by mouth 2 times daily    atorvastatin (LIPITOR) 40 MG tablet Take 1 tablet by mouth daily    dapagliflozin (FARXIGA) 5 MG tablet Take 1 tablet by mouth every morning     No current facility-administered medications for this visit.       PE  /82 (Site: Left Upper Arm, Position: Sitting, Cuff Size: Medium Adult)   Pulse 75   Temp 97.5 °F (36.4 °C) (Temporal)   Resp 16   Ht 1.702 m (5' 7\")   Wt 69.9 kg (154 lb)   SpO2 97%   BMI 24.12 kg/m²      Alert and oriented with normal mood and affect. she is well developed and well nourished . Lungs are clear without wheezing. Heart rate is regular without murmurs or gallops. There is no lower extremity edema.   Mild edema and erythema to nasal turbinates, no discharge. TM's and oral mucosa normal. No

## 2024-05-23 NOTE — PROGRESS NOTES
Patient presents for lab draw ordered by:    Ordering Provider:  Smiley Thrasher  Ordering Department/Practice:  [facility]  Phone:  [phone]  Date Ordered:  5/23/2024    The following labs were drawn and sent to [facility] by Barrie Barth:    A1C LIP CMP Microalbumin    The following tubes were sent:    2 Gold 1 Lava    Draw site left brachial.  Patient tolerated draw with no distress.

## 2024-05-23 NOTE — PROGRESS NOTES
Diabetic foot exam performed by Collin Willingham LPN      Measurement  Response Nurse Comment Physician Comment   Monofilament  R - 6/6  L - 6/6     Pulse DP R - present  L - present     Pulse TP R - present  L - present     Structural deformity R - None  L - None     Skin Integrity / Deformity R - None  L - None        Reviewed by:          Discharge instructions reviewed with patient via  (daughter)    Medication list and understanding of medications reviewed with patient via  (daughter).   OTC and herbal medications reviewed and added to med list if applicable  Barriers to adherence assessed.    Guidance given regarding new medications this visit, including reason for taking this medicine, and common side effects.     AVS given to patient. Explained to patient via  (daughter). Patient expressed understanding via  (daughter).

## 2024-09-19 ENCOUNTER — OFFICE VISIT (OUTPATIENT)
Age: 59
End: 2024-09-19

## 2024-09-19 VITALS
BODY MASS INDEX: 23.86 KG/M2 | TEMPERATURE: 97 F | SYSTOLIC BLOOD PRESSURE: 151 MMHG | WEIGHT: 152 LBS | HEART RATE: 72 BPM | OXYGEN SATURATION: 97 % | DIASTOLIC BLOOD PRESSURE: 79 MMHG | RESPIRATION RATE: 16 BRPM | HEIGHT: 67 IN

## 2024-09-19 DIAGNOSIS — E11.9 TYPE 2 DIABETES MELLITUS WITHOUT COMPLICATION, WITHOUT LONG-TERM CURRENT USE OF INSULIN (HCC): Primary | ICD-10-CM

## 2024-09-19 PROCEDURE — 99213 OFFICE O/P EST LOW 20 MIN: CPT | Performed by: NURSE PRACTITIONER

## 2024-09-19 PROCEDURE — 3046F HEMOGLOBIN A1C LEVEL >9.0%: CPT | Performed by: NURSE PRACTITIONER

## 2024-09-19 RX ORDER — GLIPIZIDE 10 MG/1
10 TABLET, FILM COATED, EXTENDED RELEASE ORAL 2 TIMES DAILY
Qty: 60 TABLET | Refills: 3 | Status: SHIPPED | OUTPATIENT
Start: 2024-09-19

## 2024-09-19 RX ORDER — METFORMIN HYDROCHLORIDE 750 MG/1
750 TABLET, EXTENDED RELEASE ORAL 2 TIMES DAILY
Qty: 60 TABLET | Refills: 3 | Status: SHIPPED | OUTPATIENT
Start: 2024-09-19

## 2024-09-19 RX ORDER — LORATADINE 10 MG/1
10 TABLET ORAL DAILY PRN
Qty: 30 TABLET | Refills: 3 | Status: SHIPPED | OUTPATIENT
Start: 2024-09-19

## 2024-10-30 ENCOUNTER — TELEPHONE (OUTPATIENT)
Age: 59
End: 2024-10-30

## 2024-10-30 RX ORDER — DAPAGLIFLOZIN 5 MG/1
5 TABLET, FILM COATED ORAL EVERY MORNING
Qty: 90 TABLET | Refills: 3
Start: 2024-10-30

## 2024-10-30 NOTE — TELEPHONE ENCOUNTER
Medication (s) requested: HYDROCODONE/APAP  Last office visit: 12/19/19  Last refill: 12/5/19   Is the patient due for refill of this medication (s): DUE 1/3/19  Medication Contract in Place: No   Date of medication Contract signed: N/A  Date of last urine drug screen: NONE ON FILE  Results of last urine drug screen: N/A       Carlos Patel,    Patient daughter Momo called to check if Ms. Clement Castellon should continue taking the metformin and glipizide along with the Farxiga.    Thank you!

## 2024-10-30 NOTE — TELEPHONE ENCOUNTER
Yes, she should take the farxiga along with metformin and glpizide     And recheck A1c after taking all these for 3 months (January)

## 2024-12-19 ENCOUNTER — OFFICE VISIT (OUTPATIENT)
Age: 59
End: 2024-12-19

## 2024-12-19 VITALS
HEIGHT: 67 IN | SYSTOLIC BLOOD PRESSURE: 150 MMHG | RESPIRATION RATE: 14 BRPM | DIASTOLIC BLOOD PRESSURE: 80 MMHG | TEMPERATURE: 96.8 F | HEART RATE: 63 BPM | BODY MASS INDEX: 23.54 KG/M2 | OXYGEN SATURATION: 98 % | WEIGHT: 150 LBS

## 2024-12-19 DIAGNOSIS — T14.8XXA MUSCLE STRAIN: ICD-10-CM

## 2024-12-19 DIAGNOSIS — E11.9 TYPE 2 DIABETES MELLITUS WITHOUT COMPLICATION, WITHOUT LONG-TERM CURRENT USE OF INSULIN (HCC): Primary | ICD-10-CM

## 2024-12-19 LAB — HBA1C MFR BLD: 9.3 %

## 2024-12-19 PROCEDURE — 3046F HEMOGLOBIN A1C LEVEL >9.0%: CPT | Performed by: NURSE PRACTITIONER

## 2024-12-19 PROCEDURE — 83036 HEMOGLOBIN GLYCOSYLATED A1C: CPT | Performed by: NURSE PRACTITIONER

## 2024-12-19 PROCEDURE — 99213 OFFICE O/P EST LOW 20 MIN: CPT | Performed by: NURSE PRACTITIONER

## 2024-12-19 RX ORDER — ATORVASTATIN CALCIUM 40 MG/1
40 TABLET, FILM COATED ORAL DAILY
Qty: 90 TABLET | Refills: 1 | Status: SHIPPED | OUTPATIENT
Start: 2024-12-19

## 2024-12-19 RX ORDER — METFORMIN HYDROCHLORIDE 750 MG/1
750 TABLET, EXTENDED RELEASE ORAL 2 TIMES DAILY
Qty: 60 TABLET | Refills: 5 | Status: SHIPPED | OUTPATIENT
Start: 2024-12-19

## 2024-12-19 RX ORDER — GLIPIZIDE 10 MG/1
10 TABLET, FILM COATED, EXTENDED RELEASE ORAL 2 TIMES DAILY
Qty: 60 TABLET | Refills: 5 | Status: SHIPPED | OUTPATIENT
Start: 2024-12-19

## 2024-12-19 NOTE — PROGRESS NOTES
Waiver of  services signed    Discharge instructions reviewed with patient via     Medication list and understanding of medications reviewed with patientvia  .   OTC and herbal medications reviewed and added to med list if applicable  Barriers to adherence assessed.    Guidance given regarding new medications this visit, including reason for taking this medicine, and common side effects.     AVS given to patient. Explained to patient via  . Patient expressed understanding via .

## 2024-12-19 NOTE — PROGRESS NOTES
NY Nuñez Clement Castellon is a 59 y.o. female being seen today for   Chief Complaint   Patient presents with    Diabetes    Other     Shoulder and back pain       Patient is here for DM follow up.  Here with daughter who serves as .    She reports only taking metformin once daily. Did not understand instructions from last visit. She is taking all other meds as prescribed. She is checking blood sugars at home, reports averaging around 120-130s.    She is complaining of pain in upper back and shoulder after lifting some furniture a couple weeks ago.       Past Medical History:   Diagnosis Date    Diabetes (HCC)          ROS  Patient states that she is feeling well. Denies complaints of chest pain, shortness of breath, swelling of legs, dizziness or weakness. she denies nausea, vomiting or diarrhea.        Current Outpatient Medications   Medication Sig    metFORMIN (GLUCOPHAGE-XR) 750 MG extended release tablet Take 1 tablet by mouth in the morning and at bedtime    glipiZIDE (GLIPIZIDE XL) 10 MG extended release tablet Take 1 tablet by mouth in the morning and at bedtime    atorvastatin (LIPITOR) 40 MG tablet Take 1 tablet by mouth daily    dapagliflozin (FARXIGA) 5 MG tablet Take 1 tablet by mouth every morning    loratadine (CLARITIN) 10 MG tablet Take 1 tablet by mouth daily as needed (allergy symptoms)     No current facility-administered medications for this visit.       PE  BP (!) 150/80 (Site: Left Upper Arm, Position: Sitting, Cuff Size: Medium Adult)   Pulse 63   Temp 96.8 °F (36 °C) (Temporal)   Resp 14   Ht 1.702 m (5' 7\")   Wt 68 kg (150 lb)   SpO2 98%   BMI 23.49 kg/m²      Alert and oriented with normal mood and affect. she is well developed and well nourished . Lungs are clear without wheezing. Heart rate is regular without murmurs or gallops. There is no lower extremity edema.   Full ROM of upper ext, normal strength and sensation.   Rpt /78    Results for orders placed or

## 2025-03-20 ENCOUNTER — HOSPITAL ENCOUNTER (OUTPATIENT)
Facility: HOSPITAL | Age: 60
Setting detail: SPECIMEN
Discharge: HOME | End: 2025-03-23

## 2025-03-20 ENCOUNTER — OFFICE VISIT (OUTPATIENT)
Age: 60
End: 2025-03-20

## 2025-03-20 VITALS
HEART RATE: 66 BPM | OXYGEN SATURATION: 97 % | BODY MASS INDEX: 23.23 KG/M2 | DIASTOLIC BLOOD PRESSURE: 73 MMHG | RESPIRATION RATE: 16 BRPM | SYSTOLIC BLOOD PRESSURE: 120 MMHG | TEMPERATURE: 97.1 F | HEIGHT: 67 IN | WEIGHT: 148 LBS

## 2025-03-20 DIAGNOSIS — Z12.11 SCREENING FOR COLON CANCER: ICD-10-CM

## 2025-03-20 DIAGNOSIS — E11.9 TYPE 2 DIABETES MELLITUS WITHOUT COMPLICATION, WITHOUT LONG-TERM CURRENT USE OF INSULIN: ICD-10-CM

## 2025-03-20 DIAGNOSIS — J30.2 SEASONAL ALLERGIES: ICD-10-CM

## 2025-03-20 DIAGNOSIS — R25.2 MUSCLE CRAMPING: ICD-10-CM

## 2025-03-20 LAB
25(OH)D3 SERPL-MCNC: 25 NG/ML (ref 30–100)
ANION GAP SERPL CALC-SCNC: 5 MMOL/L (ref 3–18)
BUN SERPL-MCNC: 17 MG/DL (ref 7–18)
BUN/CREAT SERPL: 32 (ref 12–20)
CALCIUM SERPL-MCNC: 9.2 MG/DL (ref 8.5–10.1)
CHLORIDE SERPL-SCNC: 108 MMOL/L (ref 100–111)
CO2 SERPL-SCNC: 25 MMOL/L (ref 21–32)
CREAT SERPL-MCNC: 0.53 MG/DL (ref 0.6–1.3)
EST. AVERAGE GLUCOSE BLD GHB EST-MCNC: 232 MG/DL
GLUCOSE SERPL-MCNC: 124 MG/DL (ref 74–99)
HBA1C MFR BLD: 9.7 % (ref 4.2–5.6)
MAGNESIUM SERPL-MCNC: 1.8 MG/DL (ref 1.6–2.6)
POTASSIUM SERPL-SCNC: 4.6 MMOL/L (ref 3.5–5.5)
SODIUM SERPL-SCNC: 138 MMOL/L (ref 136–145)

## 2025-03-20 PROCEDURE — 99213 OFFICE O/P EST LOW 20 MIN: CPT | Performed by: NURSE PRACTITIONER

## 2025-03-20 PROCEDURE — 80048 BASIC METABOLIC PNL TOTAL CA: CPT

## 2025-03-20 PROCEDURE — 83735 ASSAY OF MAGNESIUM: CPT

## 2025-03-20 PROCEDURE — 82306 VITAMIN D 25 HYDROXY: CPT

## 2025-03-20 PROCEDURE — 83036 HEMOGLOBIN GLYCOSYLATED A1C: CPT

## 2025-03-20 RX ORDER — LORATADINE 10 MG/1
10 TABLET ORAL DAILY PRN
Qty: 30 TABLET | Refills: 3 | Status: SHIPPED | OUTPATIENT
Start: 2025-03-20

## 2025-03-20 ASSESSMENT — PATIENT HEALTH QUESTIONNAIRE - PHQ9
1. LITTLE INTEREST OR PLEASURE IN DOING THINGS: SEVERAL DAYS
SUM OF ALL RESPONSES TO PHQ QUESTIONS 1-9: 2
SUM OF ALL RESPONSES TO PHQ QUESTIONS 1-9: 2
2. FEELING DOWN, DEPRESSED OR HOPELESS: SEVERAL DAYS
SUM OF ALL RESPONSES TO PHQ QUESTIONS 1-9: 2
SUM OF ALL RESPONSES TO PHQ QUESTIONS 1-9: 2

## 2025-03-20 NOTE — PROGRESS NOTES
Waiver of  services signed    Patient advised via  will submit referral for ophthalmology to [facility] and [facility] they will call her to schedule an appointment    Fit  kit given to patient . Instructions explained to the patient via .  Patient expressed understands instructionsvia       Discharge instructions reviewed with patient via     Medication list and understanding of medications reviewed with patient via .   OTC and herbal medications reviewed and added to med list if applicable  Barriers to adherence assessed.    Guidance given regarding new medications this visit, including reason for taking this medicine, and common side effects.     AVS given to patient. Explained to patient via . Patient expressed understanding via .  .

## 2025-03-20 NOTE — PROGRESS NOTES
NY Nuñez Clement Castellon is a 59 y.o. female being seen today for   Chief Complaint   Patient presents with    Diabetes     Patient is here for diabetes follow up  Here with daughter who serves as .    Reports feeling well. Compliant with meds - has been taking metformin BID since last visit along with glipizide and farxiga. Checking blood sugars at home. The highest she has seen is 125 fasting. After meals around 130-140s. Trying to eat healthy. No large, high carb meals. Eats oatmeal for breakfast. Likes salads for lunch. Drinks plenty of water.    Reports occ cramps in both legs. Intermittent, lasts a few hours and then resolves. Worse when standing for long periods. No swelling, numbness, tingling.    Requesting refill on allergy meds. Symptoms typically worse this time of year.       Past Medical History:   Diagnosis Date    Diabetes (HCC)          ROS  Patient states that she is feeling well. Denies complaints of chest pain, shortness of breath, swelling of legs, dizziness or weakness. she denies nausea, vomiting or diarrhea.        Current Outpatient Medications   Medication Sig    loratadine (CLARITIN) 10 MG tablet Take 1 tablet by mouth daily as needed (allergy symptoms)    metFORMIN (GLUCOPHAGE-XR) 750 MG extended release tablet Take 1 tablet by mouth in the morning and at bedtime    glipiZIDE (GLIPIZIDE XL) 10 MG extended release tablet Take 1 tablet by mouth in the morning and at bedtime    atorvastatin (LIPITOR) 40 MG tablet Take 1 tablet by mouth daily    dapagliflozin (FARXIGA) 5 MG tablet Take 1 tablet by mouth every morning     No current facility-administered medications for this visit.       PE  /73 (BP Site: Right Upper Arm, Patient Position: Sitting, BP Cuff Size: Medium Adult)   Pulse 66   Temp 97.1 °F (36.2 °C) (Temporal)   Resp 16   Ht 1.702 m (5' 7\")   Wt 67.1 kg (148 lb)   SpO2 97%   BMI 23.18 kg/m²      Alert and oriented with normal mood and affect. she is well

## 2025-03-21 ENCOUNTER — TELEPHONE (OUTPATIENT)
Age: 60
End: 2025-03-21

## 2025-03-21 NOTE — TELEPHONE ENCOUNTER
Referral for ophthalmology emailed to [facility] they will call patient to schedule an appointment

## 2025-03-26 RX ORDER — DAPAGLIFLOZIN 10 MG/1
10 TABLET, FILM COATED ORAL EVERY MORNING
Qty: 30 TABLET | Refills: 5
Start: 2025-03-26

## 2025-04-01 ENCOUNTER — HOSPITAL ENCOUNTER (OUTPATIENT)
Facility: HOSPITAL | Age: 60
Setting detail: SPECIMEN
Discharge: HOME | End: 2025-04-04

## 2025-04-01 DIAGNOSIS — Z12.11 SCREENING FOR COLON CANCER: ICD-10-CM

## 2025-04-01 PROCEDURE — 82274 ASSAY TEST FOR BLOOD FECAL: CPT

## 2025-04-02 ENCOUNTER — RESULTS FOLLOW-UP (OUTPATIENT)
Age: 60
End: 2025-04-02

## 2025-04-02 NOTE — TELEPHONE ENCOUNTER
Patient daughter advised mother A1C is still elevated. Increase Farxiga to 10 mg daily can't increase until I get  W2 forms to send to PAP program  also Vitamin D low prescription for a vitamin supplement was submitted to the pharmacy and need to schedule follow up appointment in 3 months

## 2025-04-02 NOTE — TELEPHONE ENCOUNTER
----- Message from BERTRAND Perez NP sent at 3/26/2025  9:49 AM EDT -----  Please call patient's daughter.  HA1C is still elevated. Increase Farxiga to 10 mg daily. I put in new order for PAP.  Vit D was low and I have sent in a prescription to [facility].  Follow up in 3 months.

## 2025-04-09 LAB — HEMOCCULT STL QL IA: NEGATIVE

## 2025-06-06 ENCOUNTER — TELEPHONE (OUTPATIENT)
Age: 60
End: 2025-06-06

## 2025-06-06 NOTE — CONSULTS
Session ID: [id number]  Session Duration: 3 minutes  Language: Syriac   ID: #[id number]   Name: Roxanne Corona